# Patient Record
Sex: FEMALE | Race: ASIAN | NOT HISPANIC OR LATINO
[De-identification: names, ages, dates, MRNs, and addresses within clinical notes are randomized per-mention and may not be internally consistent; named-entity substitution may affect disease eponyms.]

---

## 2020-12-17 PROBLEM — Z00.00 ENCOUNTER FOR PREVENTIVE HEALTH EXAMINATION: Status: ACTIVE | Noted: 2020-12-17

## 2021-01-25 RX ORDER — HUMAN PAPILLOMAVIRUS 9-VALENT VACCINE, RECOMBINANT 30; 40; 60; 40; 20; 20; 20; 20; 20 UG/.5ML; UG/.5ML; UG/.5ML; UG/.5ML; UG/.5ML; UG/.5ML; UG/.5ML; UG/.5ML; UG/.5ML
INJECTION, SUSPENSION INTRAMUSCULAR
Qty: 1 | Refills: 1 | Status: ACTIVE | COMMUNITY
Start: 2021-01-25 | End: 1900-01-01

## 2021-02-02 ENCOUNTER — APPOINTMENT (OUTPATIENT)
Dept: OBGYN | Facility: CLINIC | Age: 26
End: 2021-02-02

## 2021-03-13 ENCOUNTER — EMERGENCY (EMERGENCY)
Facility: HOSPITAL | Age: 26
LOS: 1 days | Discharge: HOME | End: 2021-03-13
Attending: STUDENT IN AN ORGANIZED HEALTH CARE EDUCATION/TRAINING PROGRAM
Payer: COMMERCIAL

## 2021-03-13 VITALS
WEIGHT: 130.07 LBS | OXYGEN SATURATION: 96 % | RESPIRATION RATE: 20 BRPM | HEIGHT: 65 IN | DIASTOLIC BLOOD PRESSURE: 59 MMHG | TEMPERATURE: 103 F | HEART RATE: 122 BPM | SYSTOLIC BLOOD PRESSURE: 97 MMHG

## 2021-03-13 VITALS
HEART RATE: 105 BPM | RESPIRATION RATE: 20 BRPM | TEMPERATURE: 99 F | OXYGEN SATURATION: 100 % | SYSTOLIC BLOOD PRESSURE: 96 MMHG | DIASTOLIC BLOOD PRESSURE: 56 MMHG

## 2021-03-13 LAB
ACANTHOCYTES BLD QL SMEAR: SLIGHT — SIGNIFICANT CHANGE UP
ALBUMIN SERPL ELPH-MCNC: 3.5 G/DL — SIGNIFICANT CHANGE UP (ref 3.5–5)
ALP SERPL-CCNC: 56 U/L — SIGNIFICANT CHANGE UP (ref 40–120)
ALT FLD-CCNC: 24 U/L DA — SIGNIFICANT CHANGE UP (ref 10–60)
ANION GAP SERPL CALC-SCNC: 5 MMOL/L — SIGNIFICANT CHANGE UP (ref 5–17)
ANISOCYTOSIS BLD QL: SLIGHT — SIGNIFICANT CHANGE UP
APPEARANCE UR: CLEAR — SIGNIFICANT CHANGE UP
APTT BLD: 34.6 SEC — SIGNIFICANT CHANGE UP (ref 27.5–35.5)
AST SERPL-CCNC: 24 U/L — SIGNIFICANT CHANGE UP (ref 10–40)
BACTERIA # UR AUTO: ABNORMAL /HPF
BASOPHILS # BLD AUTO: 0 K/UL — SIGNIFICANT CHANGE UP (ref 0–0.2)
BASOPHILS NFR BLD AUTO: 0 % — SIGNIFICANT CHANGE UP (ref 0–2)
BILIRUB SERPL-MCNC: 0.5 MG/DL — SIGNIFICANT CHANGE UP (ref 0.2–1.2)
BILIRUB UR-MCNC: NEGATIVE — SIGNIFICANT CHANGE UP
BUN SERPL-MCNC: 17 MG/DL — SIGNIFICANT CHANGE UP (ref 7–18)
CALCIUM SERPL-MCNC: 8.8 MG/DL — SIGNIFICANT CHANGE UP (ref 8.4–10.5)
CHLORIDE SERPL-SCNC: 104 MMOL/L — SIGNIFICANT CHANGE UP (ref 96–108)
CO2 SERPL-SCNC: 28 MMOL/L — SIGNIFICANT CHANGE UP (ref 22–31)
COLOR SPEC: YELLOW — SIGNIFICANT CHANGE UP
CREAT SERPL-MCNC: 0.84 MG/DL — SIGNIFICANT CHANGE UP (ref 0.5–1.3)
DIFF PNL FLD: ABNORMAL
EOSINOPHIL # BLD AUTO: 0 K/UL — SIGNIFICANT CHANGE UP (ref 0–0.5)
EOSINOPHIL NFR BLD AUTO: 0 % — SIGNIFICANT CHANGE UP (ref 0–6)
EPI CELLS # UR: SIGNIFICANT CHANGE UP /HPF
GLUCOSE SERPL-MCNC: 87 MG/DL — SIGNIFICANT CHANGE UP (ref 70–99)
GLUCOSE UR QL: NEGATIVE — SIGNIFICANT CHANGE UP
HCG SERPL-ACNC: <1 MIU/ML — SIGNIFICANT CHANGE UP
HCT VFR BLD CALC: 34.5 % — SIGNIFICANT CHANGE UP (ref 34.5–45)
HGB BLD-MCNC: 11.7 G/DL — SIGNIFICANT CHANGE UP (ref 11.5–15.5)
HIV 1 & 2 AB SERPL IA.RAPID: SIGNIFICANT CHANGE UP
HYPOCHROMIA BLD QL: SLIGHT — SIGNIFICANT CHANGE UP
INR BLD: 1.18 RATIO — HIGH (ref 0.88–1.16)
KETONES UR-MCNC: NEGATIVE — SIGNIFICANT CHANGE UP
LACTATE SERPL-SCNC: 0.8 MMOL/L — SIGNIFICANT CHANGE UP (ref 0.7–2)
LEUKOCYTE ESTERASE UR-ACNC: ABNORMAL
LYMPHOCYTES # BLD AUTO: 0.3 K/UL — LOW (ref 1–3.3)
LYMPHOCYTES # BLD AUTO: 4 % — LOW (ref 13–44)
MANUAL SMEAR VERIFICATION: SIGNIFICANT CHANGE UP
MCHC RBC-ENTMCNC: 31 PG — SIGNIFICANT CHANGE UP (ref 27–34)
MCHC RBC-ENTMCNC: 33.9 GM/DL — SIGNIFICANT CHANGE UP (ref 32–36)
MCV RBC AUTO: 91.5 FL — SIGNIFICANT CHANGE UP (ref 80–100)
MICROCYTES BLD QL: SLIGHT — SIGNIFICANT CHANGE UP
MONOCYTES # BLD AUTO: 0.15 K/UL — SIGNIFICANT CHANGE UP (ref 0–0.9)
MONOCYTES NFR BLD AUTO: 2 % — SIGNIFICANT CHANGE UP (ref 2–14)
NEUTROPHILS # BLD AUTO: 7.08 K/UL — SIGNIFICANT CHANGE UP (ref 1.8–7.4)
NEUTROPHILS NFR BLD AUTO: 89 % — HIGH (ref 43–77)
NEUTS BAND # BLD: 5 % — SIGNIFICANT CHANGE UP (ref 0–8)
NITRITE UR-MCNC: NEGATIVE — SIGNIFICANT CHANGE UP
NRBC # BLD: 0 /100 — SIGNIFICANT CHANGE UP (ref 0–0)
PH UR: 7 — SIGNIFICANT CHANGE UP (ref 5–8)
PLAT MORPH BLD: NORMAL — SIGNIFICANT CHANGE UP
PLATELET # BLD AUTO: 175 K/UL — SIGNIFICANT CHANGE UP (ref 150–400)
POIKILOCYTOSIS BLD QL AUTO: SLIGHT — SIGNIFICANT CHANGE UP
POLYCHROMASIA BLD QL SMEAR: SLIGHT — SIGNIFICANT CHANGE UP
POTASSIUM SERPL-MCNC: 3.5 MMOL/L — SIGNIFICANT CHANGE UP (ref 3.5–5.3)
POTASSIUM SERPL-SCNC: 3.5 MMOL/L — SIGNIFICANT CHANGE UP (ref 3.5–5.3)
PROT SERPL-MCNC: 6.9 G/DL — SIGNIFICANT CHANGE UP (ref 6–8.3)
PROT UR-MCNC: NEGATIVE — SIGNIFICANT CHANGE UP
PROTHROM AB SERPL-ACNC: 13.9 SEC — HIGH (ref 10.6–13.6)
RBC # BLD: 3.77 M/UL — LOW (ref 3.8–5.2)
RBC # FLD: 12.3 % — SIGNIFICANT CHANGE UP (ref 10.3–14.5)
RBC BLD AUTO: ABNORMAL
RBC CASTS # UR COMP ASSIST: ABNORMAL /HPF (ref 0–2)
SARS-COV-2 RNA SPEC QL NAA+PROBE: SIGNIFICANT CHANGE UP
SODIUM SERPL-SCNC: 137 MMOL/L — SIGNIFICANT CHANGE UP (ref 135–145)
SP GR SPEC: 1 — LOW (ref 1.01–1.02)
SPHEROCYTES BLD QL SMEAR: SLIGHT — SIGNIFICANT CHANGE UP
UROBILINOGEN FLD QL: NEGATIVE — SIGNIFICANT CHANGE UP
WBC # BLD: 7.53 K/UL — SIGNIFICANT CHANGE UP (ref 3.8–10.5)
WBC # FLD AUTO: 7.53 K/UL — SIGNIFICANT CHANGE UP (ref 3.8–10.5)
WBC UR QL: ABNORMAL /HPF (ref 0–5)

## 2021-03-13 PROCEDURE — 80053 COMPREHEN METABOLIC PANEL: CPT

## 2021-03-13 PROCEDURE — 84702 CHORIONIC GONADOTROPIN TEST: CPT

## 2021-03-13 PROCEDURE — 83605 ASSAY OF LACTIC ACID: CPT

## 2021-03-13 PROCEDURE — 74177 CT ABD & PELVIS W/CONTRAST: CPT | Mod: 26,MG

## 2021-03-13 PROCEDURE — 96375 TX/PRO/DX INJ NEW DRUG ADDON: CPT

## 2021-03-13 PROCEDURE — U0005: CPT

## 2021-03-13 PROCEDURE — 93005 ELECTROCARDIOGRAM TRACING: CPT

## 2021-03-13 PROCEDURE — 85610 PROTHROMBIN TIME: CPT

## 2021-03-13 PROCEDURE — 87077 CULTURE AEROBIC IDENTIFY: CPT

## 2021-03-13 PROCEDURE — G1004: CPT

## 2021-03-13 PROCEDURE — 71045 X-RAY EXAM CHEST 1 VIEW: CPT

## 2021-03-13 PROCEDURE — 96365 THER/PROPH/DIAG IV INF INIT: CPT | Mod: XU

## 2021-03-13 PROCEDURE — 74177 CT ABD & PELVIS W/CONTRAST: CPT

## 2021-03-13 PROCEDURE — 81001 URINALYSIS AUTO W/SCOPE: CPT

## 2021-03-13 PROCEDURE — 87086 URINE CULTURE/COLONY COUNT: CPT

## 2021-03-13 PROCEDURE — 87635 SARS-COV-2 COVID-19 AMP PRB: CPT

## 2021-03-13 PROCEDURE — 87150 DNA/RNA AMPLIFIED PROBE: CPT

## 2021-03-13 PROCEDURE — 87186 SC STD MICRODIL/AGAR DIL: CPT

## 2021-03-13 PROCEDURE — 71045 X-RAY EXAM CHEST 1 VIEW: CPT | Mod: 26

## 2021-03-13 PROCEDURE — 87040 BLOOD CULTURE FOR BACTERIA: CPT

## 2021-03-13 PROCEDURE — 85025 COMPLETE CBC W/AUTO DIFF WBC: CPT

## 2021-03-13 PROCEDURE — 99285 EMERGENCY DEPT VISIT HI MDM: CPT

## 2021-03-13 PROCEDURE — 96361 HYDRATE IV INFUSION ADD-ON: CPT

## 2021-03-13 PROCEDURE — 96374 THER/PROPH/DIAG INJ IV PUSH: CPT

## 2021-03-13 PROCEDURE — 99284 EMERGENCY DEPT VISIT MOD MDM: CPT | Mod: 25

## 2021-03-13 PROCEDURE — 36415 COLL VENOUS BLD VENIPUNCTURE: CPT

## 2021-03-13 PROCEDURE — 85730 THROMBOPLASTIN TIME PARTIAL: CPT

## 2021-03-13 PROCEDURE — 86703 HIV-1/HIV-2 1 RESULT ANTBDY: CPT

## 2021-03-13 RX ORDER — ONDANSETRON 8 MG/1
4 TABLET, FILM COATED ORAL ONCE
Refills: 0 | Status: COMPLETED | OUTPATIENT
Start: 2021-03-13 | End: 2021-03-13

## 2021-03-13 RX ORDER — CEFDINIR 250 MG/5ML
1 POWDER, FOR SUSPENSION ORAL
Qty: 28 | Refills: 0
Start: 2021-03-13 | End: 2021-03-26

## 2021-03-13 RX ORDER — IOHEXOL 300 MG/ML
30 INJECTION, SOLUTION INTRAVENOUS ONCE
Refills: 0 | Status: COMPLETED | OUTPATIENT
Start: 2021-03-13 | End: 2021-03-13

## 2021-03-13 RX ORDER — PIPERACILLIN AND TAZOBACTAM 4; .5 G/20ML; G/20ML
3.38 INJECTION, POWDER, LYOPHILIZED, FOR SOLUTION INTRAVENOUS ONCE
Refills: 0 | Status: COMPLETED | OUTPATIENT
Start: 2021-03-13 | End: 2021-03-13

## 2021-03-13 RX ORDER — ACETAMINOPHEN 500 MG
975 TABLET ORAL ONCE
Refills: 0 | Status: COMPLETED | OUTPATIENT
Start: 2021-03-13 | End: 2021-03-13

## 2021-03-13 RX ORDER — MORPHINE SULFATE 50 MG/1
4 CAPSULE, EXTENDED RELEASE ORAL ONCE
Refills: 0 | Status: DISCONTINUED | OUTPATIENT
Start: 2021-03-13 | End: 2021-03-13

## 2021-03-13 RX ORDER — SODIUM CHLORIDE 9 MG/ML
1850 INJECTION INTRAMUSCULAR; INTRAVENOUS; SUBCUTANEOUS ONCE
Refills: 0 | Status: COMPLETED | OUTPATIENT
Start: 2021-03-13 | End: 2021-03-13

## 2021-03-13 RX ADMIN — PIPERACILLIN AND TAZOBACTAM 200 GRAM(S): 4; .5 INJECTION, POWDER, LYOPHILIZED, FOR SOLUTION INTRAVENOUS at 15:37

## 2021-03-13 RX ADMIN — SODIUM CHLORIDE 1850 MILLILITER(S): 9 INJECTION INTRAMUSCULAR; INTRAVENOUS; SUBCUTANEOUS at 17:34

## 2021-03-13 RX ADMIN — IOHEXOL 30 MILLILITER(S): 300 INJECTION, SOLUTION INTRAVENOUS at 17:15

## 2021-03-13 RX ADMIN — Medication 975 MILLIGRAM(S): at 16:15

## 2021-03-13 RX ADMIN — Medication 975 MILLIGRAM(S): at 15:35

## 2021-03-13 RX ADMIN — ONDANSETRON 4 MILLIGRAM(S): 8 TABLET, FILM COATED ORAL at 17:15

## 2021-03-13 RX ADMIN — SODIUM CHLORIDE 1850 MILLILITER(S): 9 INJECTION INTRAMUSCULAR; INTRAVENOUS; SUBCUTANEOUS at 15:35

## 2021-03-13 RX ADMIN — MORPHINE SULFATE 4 MILLIGRAM(S): 50 CAPSULE, EXTENDED RELEASE ORAL at 21:43

## 2021-03-13 RX ADMIN — PIPERACILLIN AND TAZOBACTAM 3.38 GRAM(S): 4; .5 INJECTION, POWDER, LYOPHILIZED, FOR SOLUTION INTRAVENOUS at 16:15

## 2021-03-13 NOTE — ED ADULT NURSE NOTE - OBJECTIVE STATEMENT
As per pt, c/o RLQ radiating to the lower back w/ f/c, h/a, SOB and n/v x2days. Pt denies hematuria, CP or diarrhea. LMP 2/28/2021.

## 2021-03-13 NOTE — ED PROVIDER NOTE - CLINICAL SUMMARY MEDICAL DECISION MAKING FREE TEXT BOX
26F presneitng with rlq pain, nausea and vomiting. code sepsis called. possible abdominal etiology. will get labs, CT, will reassess. 26F presenting with rlq pain, nausea and vomiting. code sepsis called. possible abdominal etiology. will get labs, CT, will reassess.

## 2021-03-13 NOTE — ED PROVIDER NOTE - PATIENT PORTAL LINK FT
You can access the FollowMyHealth Patient Portal offered by [facility] by registering at the following website: [email or web address]. By joining [employer]’s FollowMyHealth portal, you will also be able to view your health information using other applications (apps) compatible with our system.

## 2021-03-13 NOTE — ED PROVIDER NOTE - OBJECTIVE STATEMENT
26F, pmh of Tourette's, anxiety, depression, presenting with two days of abdominal pain. pain is worse in right lower quadrant. has nausea and vomiting. no chest pain, shortness of breath, pain or burning with urination. no known sick contacts.

## 2021-03-13 NOTE — ED ADULT NURSE NOTE - CHIEF COMPLAINT QUOTE
rlq pain for the last 2 today with vomiting toady, sent from [facility] [place] to r/o epi

## 2021-03-13 NOTE — ED ADULT TRIAGE NOTE - CHIEF COMPLAINT QUOTE
rlq pain for the last 2 today with vomiting toady, sent from [facility] to r/o epi

## 2021-03-13 NOTE — ED PROVIDER NOTE - NSFOLLOWUPINSTRUCTIONS_ED_ALL_ED_FT
Pyelonephritis, Adult    Pyelonephritis is an infection that occurs in the kidney. The kidneys are the organs that filter a person's blood and move waste out of the bloodstream and into the urine. Urine passes from the kidneys, through tubes called ureters, and into the bladder. There are two main types of pyelonephritis:  •Infections that come on quickly without any warning (acute pyelonephritis).      •Infections that last for a long period of time (chronic pyelonephritis).      In most cases, the infection clears up with treatment and does not cause further problems. More severe infections or chronic infections can sometimes spread to the bloodstream or lead to other problems with the kidneys.      What are the causes?  This condition is usually caused by:  •Bacteria traveling from the bladder up to the kidney. This may occur after having a bladder infection (cystitis) or urinary tract infection (UTI).      •Bladder infections caused from bacteria traveling from the bloodstream to the kidney.        What increases the risk?  This condition is more likely to develop in:  •Pregnant women.      •Older people.    •People who have any of these conditions:  •Diabetes.      •Inflammation of the prostate gland (prostatitis), in males.      •Kidney stones or bladder stones.      •Other abnormalities of the kidney or ureter.      •Cancer.        •People who have a catheter placed in the bladder.      •People who are sexually active.      •Women who use spermicides.      •People who have had a prior UTI.        What are the signs or symptoms?  Symptoms of this condition include:  •Frequent urination.      •Strong or persistent urge to urinate.      •Burning or stinging when urinating.      •Abdominal pain.      •Back pain.      •Pain in the side or flank area.      •Fever or chills.      •Blood in the urine, or dark urine.      •Nausea or vomiting.        How is this diagnosed?  This condition may be diagnosed based on:  •Your medical history and a physical exam.      •Urine tests.      •Blood tests.      You may also have imaging tests of the kidneys, such as an ultrasound or CT scan.      How is this treated?  Treatment for this condition may depend on the severity of the infection.  •If the infection is mild and is found early, you may be treated with antibiotic medicines taken by mouth (orally). You will need to drink fluids to remain hydrated.      •If the infection is more severe, you may need to stay in the hospital and receive antibiotics given directly into a vein through an IV. You may also need to receive fluids through an IV if you are not able to remain hydrated. After your hospital stay, you may need to take oral antibiotics for a period of time.      Other treatments may be required, depending on the cause of the infection.      Follow these instructions at home:    Medicines     •Take your antibiotic medicine as told by your health care provider. Do not stop taking the antibiotic even if you start to feel better.      •Take over-the-counter and prescription medicines only as told by your health care provider.        General instructions      •Drink enough fluid to keep your urine pale yellow.      •Avoid caffeine, tea, and carbonated beverages. They tend to irritate the bladder.      •Urinate often. Avoid holding in urine for long periods of time.      •Urinate before and after sex.      •After a bowel movement, women should cleanse from front to back. Use each tissue only once.      •Keep all follow-up visits as told by your health care provider. This is important.        Contact a health care provider if:    •Your symptoms do not get better after 2 days of treatment.      •Your symptoms get worse.      •You have a fever.        Get help right away if you:    •Are unable to take your antibiotics or fluids.      •Have shaking chills.      •Vomit.      •Have severe flank or back pain.      •Have extreme weakness or fainting.        Summary    •Pyelonephritis is a urinary tract infection (UTI) that occurs in the kidney.      •Treatment for this condition may depend on the severity of the infection.      •Take your antibiotic medicine as told by your health care provider. Do not stop taking the antibiotic even if you start to feel better.      •Drink enough fluid to keep your urine pale yellow.      •Keep all follow-up visits as told by your health care provider. This is important.      This information is not intended to replace advice given to you by your health care provider. Make sure you discuss any questions you have with your health care provider.      Document Revised: 10/22/2019 Document Reviewed: 10/22/2019    Elsevier Patient Education © 2021 [employer]

## 2021-03-13 NOTE — ED PROVIDER NOTE - PHYSICAL EXAMINATION
General: well appearing female, no acute distress   HEENT: normocephalic, atraumatic   Respiratory: normal work of breathing, lungs clear to auscultation bilaterally   Cardiac: regular rate and rhythm   Abdomen: soft, RLQ tenderness to palpation, no guarding or rebound   MSK: no swelling or tenderness of lower extremities, moving all extremities spontaneously   Skin: warm, dry   Neuro: A&Ox3  Psych: appropriate affect

## 2021-03-14 ENCOUNTER — INPATIENT (INPATIENT)
Facility: HOSPITAL | Age: 26
LOS: 1 days | Discharge: HOME | End: 2021-03-16
Attending: STUDENT IN AN ORGANIZED HEALTH CARE EDUCATION/TRAINING PROGRAM | Admitting: STUDENT IN AN ORGANIZED HEALTH CARE EDUCATION/TRAINING PROGRAM
Payer: COMMERCIAL

## 2021-03-14 VITALS
RESPIRATION RATE: 20 BRPM | HEART RATE: 95 BPM | OXYGEN SATURATION: 100 % | SYSTOLIC BLOOD PRESSURE: 103 MMHG | TEMPERATURE: 98 F | DIASTOLIC BLOOD PRESSURE: 60 MMHG | HEIGHT: 65 IN

## 2021-03-14 DIAGNOSIS — Z98.890 OTHER SPECIFIED POSTPROCEDURAL STATES: Chronic | ICD-10-CM

## 2021-03-14 DIAGNOSIS — F32.9 MAJOR DEPRESSIVE DISORDER, SINGLE EPISODE, UNSPECIFIED: ICD-10-CM

## 2021-03-14 DIAGNOSIS — D72.829 ELEVATED WHITE BLOOD CELL COUNT, UNSPECIFIED: ICD-10-CM

## 2021-03-14 DIAGNOSIS — A41.9 SEPSIS, UNSPECIFIED ORGANISM: ICD-10-CM

## 2021-03-14 DIAGNOSIS — Z29.9 ENCOUNTER FOR PROPHYLACTIC MEASURES, UNSPECIFIED: ICD-10-CM

## 2021-03-14 DIAGNOSIS — F95.2 TOURETTE'S DISORDER: ICD-10-CM

## 2021-03-14 DIAGNOSIS — N12 TUBULO-INTERSTITIAL NEPHRITIS, NOT SPECIFIED AS ACUTE OR CHRONIC: ICD-10-CM

## 2021-03-14 DIAGNOSIS — R79.89 OTHER SPECIFIED ABNORMAL FINDINGS OF BLOOD CHEMISTRY: ICD-10-CM

## 2021-03-14 DIAGNOSIS — F41.9 ANXIETY DISORDER, UNSPECIFIED: ICD-10-CM

## 2021-03-14 DIAGNOSIS — R78.81 BACTEREMIA: ICD-10-CM

## 2021-03-14 LAB
ALBUMIN SERPL ELPH-MCNC: 3.8 G/DL — SIGNIFICANT CHANGE UP (ref 3.3–5)
ALP SERPL-CCNC: 60 U/L — SIGNIFICANT CHANGE UP (ref 40–120)
ALT FLD-CCNC: 21 U/L — SIGNIFICANT CHANGE UP (ref 4–33)
ANION GAP SERPL CALC-SCNC: 10 MMOL/L — SIGNIFICANT CHANGE UP (ref 7–14)
APPEARANCE UR: CLEAR — SIGNIFICANT CHANGE UP
AST SERPL-CCNC: 22 U/L — SIGNIFICANT CHANGE UP (ref 4–32)
BACTERIA # UR AUTO: ABNORMAL
BILIRUB SERPL-MCNC: 0.5 MG/DL — SIGNIFICANT CHANGE UP (ref 0.2–1.2)
BILIRUB UR-MCNC: NEGATIVE — SIGNIFICANT CHANGE UP
BLOOD GAS VENOUS COMPREHENSIVE RESULT: SIGNIFICANT CHANGE UP
BLOOD GAS VENOUS COMPREHENSIVE RESULT: SIGNIFICANT CHANGE UP
BUN SERPL-MCNC: 10 MG/DL — SIGNIFICANT CHANGE UP (ref 7–23)
CALCIUM SERPL-MCNC: 9.1 MG/DL — SIGNIFICANT CHANGE UP (ref 8.4–10.5)
CHLORIDE SERPL-SCNC: 103 MMOL/L — SIGNIFICANT CHANGE UP (ref 98–107)
CO2 SERPL-SCNC: 25 MMOL/L — SIGNIFICANT CHANGE UP (ref 22–31)
COLOR SPEC: YELLOW — SIGNIFICANT CHANGE UP
CREAT SERPL-MCNC: 0.75 MG/DL — SIGNIFICANT CHANGE UP (ref 0.5–1.3)
CULTURE RESULTS: SIGNIFICANT CHANGE UP
DIFF PNL FLD: ABNORMAL
E COLI DNA BLD POS QL NAA+NON-PROBE: SIGNIFICANT CHANGE UP
EPI CELLS # UR: 3 /HPF — SIGNIFICANT CHANGE UP (ref 0–5)
GLUCOSE SERPL-MCNC: 79 MG/DL — SIGNIFICANT CHANGE UP (ref 70–99)
GLUCOSE UR QL: NEGATIVE — SIGNIFICANT CHANGE UP
GRAM STN FLD: SIGNIFICANT CHANGE UP
HCT VFR BLD CALC: 36.3 % — SIGNIFICANT CHANGE UP (ref 34.5–45)
HGB BLD-MCNC: 11.5 G/DL — SIGNIFICANT CHANGE UP (ref 11.5–15.5)
IANC: 11.69 K/UL — HIGH (ref 1.5–8.5)
KETONES UR-MCNC: ABNORMAL
LEUKOCYTE ESTERASE UR-ACNC: ABNORMAL
MCHC RBC-ENTMCNC: 30.2 PG — SIGNIFICANT CHANGE UP (ref 27–34)
MCHC RBC-ENTMCNC: 31.7 GM/DL — LOW (ref 32–36)
MCV RBC AUTO: 95.3 FL — SIGNIFICANT CHANGE UP (ref 80–100)
METHOD TYPE: SIGNIFICANT CHANGE UP
NITRITE UR-MCNC: NEGATIVE — SIGNIFICANT CHANGE UP
PH UR: 6.5 — SIGNIFICANT CHANGE UP (ref 5–8)
PLATELET # BLD AUTO: 166 K/UL — SIGNIFICANT CHANGE UP (ref 150–400)
POTASSIUM SERPL-MCNC: 4.2 MMOL/L — SIGNIFICANT CHANGE UP (ref 3.5–5.3)
POTASSIUM SERPL-SCNC: 4.2 MMOL/L — SIGNIFICANT CHANGE UP (ref 3.5–5.3)
PROT SERPL-MCNC: 6.8 G/DL — SIGNIFICANT CHANGE UP (ref 6–8.3)
PROT UR-MCNC: ABNORMAL
RBC # BLD: 3.81 M/UL — SIGNIFICANT CHANGE UP (ref 3.8–5.2)
RBC # FLD: 12.6 % — SIGNIFICANT CHANGE UP (ref 10.3–14.5)
RBC CASTS # UR COMP ASSIST: 6 /HPF — HIGH (ref 0–4)
SODIUM SERPL-SCNC: 138 MMOL/L — SIGNIFICANT CHANGE UP (ref 135–145)
SP GR SPEC: 1.02 — SIGNIFICANT CHANGE UP (ref 1.01–1.02)
SPECIMEN SOURCE: SIGNIFICANT CHANGE UP
SPECIMEN SOURCE: SIGNIFICANT CHANGE UP
UROBILINOGEN FLD QL: ABNORMAL
WBC # BLD: 13.7 K/UL — HIGH (ref 3.8–10.5)
WBC # FLD AUTO: 13.7 K/UL — HIGH (ref 3.8–10.5)
WBC UR QL: 16 /HPF — HIGH (ref 0–5)

## 2021-03-14 PROCEDURE — 99223 1ST HOSP IP/OBS HIGH 75: CPT

## 2021-03-14 PROCEDURE — 99285 EMERGENCY DEPT VISIT HI MDM: CPT

## 2021-03-14 RX ORDER — ACETAMINOPHEN 500 MG
975 TABLET ORAL ONCE
Refills: 0 | Status: COMPLETED | OUTPATIENT
Start: 2021-03-14 | End: 2021-03-14

## 2021-03-14 RX ORDER — CEFTRIAXONE 500 MG/1
2000 INJECTION, POWDER, FOR SOLUTION INTRAMUSCULAR; INTRAVENOUS ONCE
Refills: 0 | Status: COMPLETED | OUTPATIENT
Start: 2021-03-14 | End: 2021-03-14

## 2021-03-14 RX ORDER — SERTRALINE 25 MG/1
1 TABLET, FILM COATED ORAL
Qty: 0 | Refills: 0 | DISCHARGE

## 2021-03-14 RX ORDER — PIPERACILLIN AND TAZOBACTAM 4; .5 G/20ML; G/20ML
3.38 INJECTION, POWDER, LYOPHILIZED, FOR SOLUTION INTRAVENOUS EVERY 8 HOURS
Refills: 0 | Status: DISCONTINUED | OUTPATIENT
Start: 2021-03-14 | End: 2021-03-16

## 2021-03-14 RX ORDER — ACETAMINOPHEN 500 MG
650 TABLET ORAL EVERY 6 HOURS
Refills: 0 | Status: DISCONTINUED | OUTPATIENT
Start: 2021-03-14 | End: 2021-03-15

## 2021-03-14 RX ORDER — SODIUM CHLORIDE 9 MG/ML
3 INJECTION INTRAMUSCULAR; INTRAVENOUS; SUBCUTANEOUS EVERY 8 HOURS
Refills: 0 | Status: DISCONTINUED | OUTPATIENT
Start: 2021-03-14 | End: 2021-03-16

## 2021-03-14 RX ORDER — FLUPHENAZINE HYDROCHLORIDE 1 MG/1
6 TABLET, FILM COATED ORAL
Qty: 0 | Refills: 0 | DISCHARGE

## 2021-03-14 RX ORDER — PIPERACILLIN AND TAZOBACTAM 4; .5 G/20ML; G/20ML
3.38 INJECTION, POWDER, LYOPHILIZED, FOR SOLUTION INTRAVENOUS ONCE
Refills: 0 | Status: COMPLETED | OUTPATIENT
Start: 2021-03-14 | End: 2021-03-14

## 2021-03-14 RX ORDER — CLONAZEPAM 1 MG
1 TABLET ORAL THREE TIMES A DAY
Refills: 0 | Status: DISCONTINUED | OUTPATIENT
Start: 2021-03-14 | End: 2021-03-16

## 2021-03-14 RX ORDER — SODIUM CHLORIDE 9 MG/ML
2000 INJECTION INTRAMUSCULAR; INTRAVENOUS; SUBCUTANEOUS ONCE
Refills: 0 | Status: COMPLETED | OUTPATIENT
Start: 2021-03-14 | End: 2021-03-14

## 2021-03-14 RX ORDER — BUPROPION HYDROCHLORIDE 150 MG/1
1 TABLET, EXTENDED RELEASE ORAL
Qty: 0 | Refills: 0 | DISCHARGE

## 2021-03-14 RX ORDER — KETOROLAC TROMETHAMINE 30 MG/ML
30 SYRINGE (ML) INJECTION ONCE
Refills: 0 | Status: DISCONTINUED | OUTPATIENT
Start: 2021-03-14 | End: 2021-03-14

## 2021-03-14 RX ORDER — HEPARIN SODIUM 5000 [USP'U]/ML
5000 INJECTION INTRAVENOUS; SUBCUTANEOUS EVERY 12 HOURS
Refills: 0 | Status: DISCONTINUED | OUTPATIENT
Start: 2021-03-14 | End: 2021-03-15

## 2021-03-14 RX ORDER — FLUPHENAZINE HYDROCHLORIDE 1 MG/1
2 TABLET, FILM COATED ORAL THREE TIMES A DAY
Refills: 0 | Status: DISCONTINUED | OUTPATIENT
Start: 2021-03-14 | End: 2021-03-16

## 2021-03-14 RX ORDER — BUPROPION HYDROCHLORIDE 150 MG/1
300 TABLET, EXTENDED RELEASE ORAL DAILY
Refills: 0 | Status: DISCONTINUED | OUTPATIENT
Start: 2021-03-14 | End: 2021-03-16

## 2021-03-14 RX ORDER — DIPHENHYDRAMINE HCL 50 MG
25 CAPSULE ORAL ONCE
Refills: 0 | Status: COMPLETED | OUTPATIENT
Start: 2021-03-14 | End: 2021-03-14

## 2021-03-14 RX ORDER — ARIPIPRAZOLE 15 MG/1
1 TABLET ORAL
Qty: 0 | Refills: 0 | DISCHARGE

## 2021-03-14 RX ORDER — SERTRALINE 25 MG/1
100 TABLET, FILM COATED ORAL DAILY
Refills: 0 | Status: DISCONTINUED | OUTPATIENT
Start: 2021-03-14 | End: 2021-03-16

## 2021-03-14 RX ORDER — ARIPIPRAZOLE 15 MG/1
10 TABLET ORAL DAILY
Refills: 0 | Status: DISCONTINUED | OUTPATIENT
Start: 2021-03-14 | End: 2021-03-16

## 2021-03-14 RX ORDER — CLONAZEPAM 1 MG
1 TABLET ORAL
Qty: 0 | Refills: 0 | DISCHARGE

## 2021-03-14 RX ADMIN — SERTRALINE 100 MILLIGRAM(S): 25 TABLET, FILM COATED ORAL at 23:02

## 2021-03-14 RX ADMIN — CEFTRIAXONE 100 MILLIGRAM(S): 500 INJECTION, POWDER, FOR SOLUTION INTRAMUSCULAR; INTRAVENOUS at 18:51

## 2021-03-14 RX ADMIN — SODIUM CHLORIDE 3 MILLILITER(S): 9 INJECTION INTRAMUSCULAR; INTRAVENOUS; SUBCUTANEOUS at 21:04

## 2021-03-14 RX ADMIN — SODIUM CHLORIDE 2000 MILLILITER(S): 9 INJECTION INTRAMUSCULAR; INTRAVENOUS; SUBCUTANEOUS at 20:13

## 2021-03-14 RX ADMIN — FLUPHENAZINE HYDROCHLORIDE 2 MILLIGRAM(S): 1 TABLET, FILM COATED ORAL at 22:53

## 2021-03-14 RX ADMIN — PIPERACILLIN AND TAZOBACTAM 200 GRAM(S): 4; .5 INJECTION, POWDER, LYOPHILIZED, FOR SOLUTION INTRAVENOUS at 22:36

## 2021-03-14 RX ADMIN — Medication 650 MILLIGRAM(S): at 22:19

## 2021-03-14 RX ADMIN — Medication 975 MILLIGRAM(S): at 20:13

## 2021-03-14 RX ADMIN — Medication 1 MILLIGRAM(S): at 22:54

## 2021-03-14 RX ADMIN — Medication 30 MILLIGRAM(S): at 20:13

## 2021-03-14 RX ADMIN — Medication 650 MILLIGRAM(S): at 21:19

## 2021-03-14 RX ADMIN — Medication 25 MILLIGRAM(S): at 18:51

## 2021-03-14 RX ADMIN — Medication 40 MILLIGRAM(S): at 18:51

## 2021-03-14 NOTE — H&P ADULT - PROBLEM SELECTOR PLAN 4
WBC count of 13.70 likely in setting of pyelonephritis.  Continue to trend. Patient with lactate of 2.4.  Patient s/p 2 liters Normal saline bolus.  Repeat VBG in AM.

## 2021-03-14 NOTE — H&P ADULT - PROBLEM SELECTOR PLAN 1
Admit to medicine, continue monitoring.  CTAP showing Multifocal pyelonephritis involving the upper and lower pole right kidney, with uroepithelial hyperenhancement in the proximal right ureter.  Blood cultures positive for gram negative rods/ Found to be growing E. Coli.  Patient s/p Ceftriaxone 2000 mg. Continue ceftriaxone 2000mg q24h.  Monitor vitals closely.  Acetaminophen 650 prn q6h for Fever. Admit to medicine, continue monitoring.  CTAP showing Multifocal pyelonephritis involving the upper and lower pole right kidney, with uroepithelial hyperenhancement in the proximal right ureter.  Blood cultures positive for gram negative rods/ Found to be growing E. Coli.  Patient s/p Ceftriaxone 2000 mg. Continue ceftriaxone 2000mg q24h.  Consider adding zosyn if patient decompensates.   Monitor vitals closely.  Acetaminophen 650 prn q6h for Fever. Admit to medicine, continue monitoring.  CTAP showing Multifocal pyelonephritis involving the upper and lower pole right kidney, with uroepithelial hyperenhancement in the proximal right ureter.  Blood cultures positive for gram negative rods/ Found to be growing E. Coli.  Patient s/p Ceftriaxone 2000 mg.   Patient started on zosyn.  Monitor vitals closely.  Acetaminophen 650 prn q6h for Fever.  Follow up blood and urine cultures. Patient with blood cultures growing gram negative rods (E.coli)  Patient meets sepsis criteria due to fever and leukocytosis.  Monitor vitals q4h.  Continue Zosyn.

## 2021-03-14 NOTE — H&P ADULT - NSHPSOCIALHISTORY_GEN_ALL_CORE
Patient lives with mother and father. Patient denies use of cigarettes, drugs and alcohol. Patient works as .

## 2021-03-14 NOTE — ED PROVIDER NOTE - OBJECTIVE STATEMENT
25 Y/O F PMH Tourettes Anxiety Depression C/O 3 days of lower abdominal pain radiating to the back. Pt states she has been having persistent chills, weakness and has been feeling unwell since her ED visit on 3/13. Pt denies nausea or diarrhea, pt denies any other sx or acute complaints. Pt was called back regarding + blood cultures (Aidan neg Kenji's).

## 2021-03-14 NOTE — H&P ADULT - NEGATIVE GASTROINTESTINAL SYMPTOMS
Patient called to verify if Actos needs to be stopped    Reviewed message with Darlene GOMES RN; Actos needs to be stopped.    Patient took dose today but will stop future doses.   no diarrhea

## 2021-03-14 NOTE — ED ADULT NURSE REASSESSMENT NOTE - NS ED NURSE REASSESS COMMENT FT1
Admitting PA at bedside and aware of temperature at this time. Patient offering no complaints and resting comfortably.

## 2021-03-14 NOTE — H&P ADULT - PROBLEM SELECTOR PLAN 2
Patient with blood cultures growing gram negative rods.  Patient meets sepsis criteria due to fever and leukocytosis.  Monitor vitals q4h.  Continue ceftriaxone 2 gram q24 hours. Patient with blood cultures growing gram negative rods (E.coli)  Patient meets sepsis criteria due to fever and leukocytosis.  Monitor vitals q4h.  Continue Zosyn. Admit to medicine, continue monitoring.  CTAP showing Multifocal pyelonephritis involving the upper and lower pole right kidney, with uroepithelial hyperenhancement in the proximal right ureter.  Blood cultures positive for gram negative rods/ Found to be growing E. Coli.  Patient s/p Ceftriaxone 2000 mg.   Patient started on zosyn.  Monitor vitals closely.  Acetaminophen 650 prn q6h for Fever.  Follow up blood and urine cultures.

## 2021-03-14 NOTE — ED ADULT NURSE NOTE - OBJECTIVE STATEMENT
Received Pt in intake room 9. pt A&OX3, ambulatory. pt with hx of depression, anxiety. Pt reports was seen in the ED yesterday for lower abdominal pain, was called to come into the ED today for positive urine cultures. pt reports lower abdominal pain at this time and headache but denies any other complaints. denies cp, sob, cough, fever/chills, n/v/d. respirations are equal and nonlabored, no respiratory distress noted. 20 gauge iv placed in the right ac, labs sent. pt stable. will medicate as per orders.

## 2021-03-14 NOTE — H&P ADULT - PROBLEM SELECTOR PLAN 7
Continue Abilify 10 mg daily and fluphenazine 1 mg,6 tabs daily. Continue sertraline 100 mg daily and clonazepam1 mg TID.

## 2021-03-14 NOTE — H&P ADULT - PROBLEM SELECTOR PLAN 6
Continue sertraline 100 mg daily and clonazepam1 mg TID. Continue Bupropion 300 mg daily, and abilify.

## 2021-03-14 NOTE — ED PROVIDER NOTE - CLINICAL SUMMARY MEDICAL DECISION MAKING FREE TEXT BOX
25 Y/O F PMH Tourettes Anxiety Depression C/O 3 days of lower abdominal pain radiating to the back. Pt states she has been having persistent chills, weakness and has been feeling unwell since her ED visit on 3/13. Plan is antibiotics for + blood culture, labs to eval for leukocytosis or elevated lactate, if + suspicious for sepsis. Pt is stable, not requiring pressors at this time.

## 2021-03-14 NOTE — H&P ADULT - PROBLEM SELECTOR PLAN 8
Heparin subcutaneous 5000 units q12h. Continue Abilify 10 mg daily and fluphenazine 1 mg,6 tabs daily.

## 2021-03-14 NOTE — H&P ADULT - NSHPLABSRESULTS_GEN_ALL_CORE
Vital Signs Last 24 Hrs  T(C): 38.1 (14 Mar 2021 21:00), Max: 38.1 (14 Mar 2021 21:00)  T(F): 100.5 (14 Mar 2021 21:00), Max: 100.5 (14 Mar 2021 21:00)  HR: 97 (14 Mar 2021 21:00) (95 - 97)  BP: 105/58 (14 Mar 2021 21:00) (103/60 - 109/63)  BP(mean): --  RR: 18 (14 Mar 2021 21:00) (18 - 20)  SpO2: 98% (14 Mar 2021 21:00) (98% - 100%)                          11.5   13.70 )-----------( 166      ( 14 Mar 2021 19:25 )             36.3   03-14    138  |  103  |  10  ----------------------------<  79  4.2   |  25  |  0.75    Ca    9.1      14 Mar 2021 19:31    TPro  6.8  /  Alb  3.8  /  TBili  0.5  /  DBili  x   /  AST  22  /  ALT  21  /  AlkPhos  60  03-14    PT/INR - ( 13 Mar 2021 15:51 )   PT: 13.9 sec;   INR: 1.18 ratio         PTT - ( 13 Mar 2021 15:51 )  PTT:34.6 sec    19:25 - VBG - pH: 7.31  | pCO2: 56    | pO2: <24   | Lactate: 2.4      Urinalysis Basic - ( 14 Mar 2021 19:25 )    Color: Yellow / Appearance: Clear / S.021 / pH: x  Gluc: x / Ketone: Small  / Bili: Negative / Urobili: 12 mg/dL   Blood: x / Protein: 30 mg/dL / Nitrite: Negative   Leuk Esterase: Small / RBC: 6 /HPF / WBC 16 /HPF   Sq Epi: x / Non Sq Epi: 3 /HPF / Bacteria: Few      Urine and blood culture.  .Urine Clean Catch (Midstream)   @ 22:00   <10,000 CFU/mL Normal Urogenital Ramona  --  --      .Blood Blood-Peripheral   @ 21:57   Growth in anaerobic bottle: Gram Negative Rods  ***Blood Panel PCR results on this specimen are available  approximately 3 hours after the Gram stain result.***  Gram stain, PCR, and/or culture results may not always  correspond due to difference in methodologies.  ************************************************************  This PCR assay was performed by multiplex PCR. This  Assay tests for 66 bacterial and resistance gene targets.  Please refer to the [facility] test directory  at [email or web address] for details.  --  Blood Culture PCR       : CT Abdomen and pelvis:  FINDINGS:  LOWER CHEST: Clear    LIVER: Focal hepatic steatosis adjacent to the gallbladder fossa.  BILE DUCTS: Normal caliber.  GALLBLADDER: Nonspecific edema along the hepatic margin.  SPLEEN: Within normal limits.  PANCREAS: Within normal limits.  ADRENALS: Within normal limits.  KIDNEYS/URETERS: Multifocal pyelonephritis involving the upper and lower pole right kidney, with uroepithelial hyperenhancement in the proximal right ureter. No abscess.    BLADDER: No abnormal mural thickening.  REPRODUCTIVE ORGANS: 2 cm right ovarian cyst. Possible bilateral hydrosalpinx..    BOWEL: No bowel obstruction. Enteric contrast transits to the rectum.Normal appendix.  PERITONEUM: No intraperitoneal free air.  VESSELS: Normal in caliber.  RETROPERITONEUM/LYMPH NODES: No hemorrhage. No enlarged lymph node measuring greater than 10 mm in short axis.  ABDOMINAL WALL: Intact  BONES: No acute osseous abnormality.    IMPRESSION:    1. Multifocal pyelonephritis in the right kidney without abscess.  2. Possible mild bilateral hydrosalpinx.    CXR: Findings/  Impression: The heart is unremarkable. The lungs are clear. Bones are unremarkable for age.     EKG: Sinus rhythm at 95 bpm. QT/QTc 318/399. No acute T wave or ST changes. 11.5   13.70 )-----------( 166      ( 14 Mar 2021 19:25 )             36.3   03-14    138  |  103  |  10  ----------------------------<  79  4.2   |  25  |  0.75    Ca    9.1      14 Mar 2021 19:31    TPro  6.8  /  Alb  3.8  /  TBili  0.5  /  DBili  x   /  AST  22  /  ALT  21  /  AlkPhos  60  03-14    PT/INR - ( 13 Mar 2021 15:51 )   PT: 13.9 sec;   INR: 1.18 ratio         PTT - ( 13 Mar 2021 15:51 )  PTT:34.6 sec    19:25 - VBG - pH: 7.31  | pCO2: 56    | pO2: <24   | Lactate: 2.4      Urinalysis Basic - ( 14 Mar 2021 19:25 )    Color: Yellow / Appearance: Clear / S.021 / pH: x  Gluc: x / Ketone: Small  / Bili: Negative / Urobili: 12 mg/dL   Blood: x / Protein: 30 mg/dL / Nitrite: Negative   Leuk Esterase: Small / RBC: 6 /HPF / WBC 16 /HPF   Sq Epi: x / Non Sq Epi: 3 /HPF / Bacteria: Few      Urine and blood culture.  .Urine Clean Catch (Midstream)   @ 22:00   <10,000 CFU/mL Normal Urogenital Ramona  --  --      .Blood Blood-Peripheral   @ 21:57   Growth in anaerobic bottle: Gram Negative Rods  ***Blood Panel PCR results on this specimen are available  approximately 3 hours after the Gram stain result.***  Gram stain, PCR, and/or culture results may not always  correspond due to difference in methodologies.  ************************************************************  This PCR assay was performed by multiplex PCR. This  Assay tests for 66 bacterial and resistance gene targets.  Please refer to the [facility] [employer] test directory  at [email or web address] for details.  --  Blood Culture PCR       : CT Abdomen and pelvis:  FINDINGS:  LOWER CHEST: Clear    LIVER: Focal hepatic steatosis adjacent to the gallbladder fossa.  BILE DUCTS: Normal caliber.  GALLBLADDER: Nonspecific edema along the hepatic margin.  SPLEEN: Within normal limits.  PANCREAS: Within normal limits.  ADRENALS: Within normal limits.  KIDNEYS/URETERS: Multifocal pyelonephritis involving the upper and lower pole right kidney, with uroepithelial hyperenhancement in the proximal right ureter. No abscess.    BLADDER: No abnormal mural thickening.  REPRODUCTIVE ORGANS: 2 cm right ovarian cyst. Possible bilateral hydrosalpinx..    BOWEL: No bowel obstruction. Enteric contrast transits to the rectum.Normal appendix.  PERITONEUM: No intraperitoneal free air.  VESSELS: Normal in caliber.  RETROPERITONEUM/LYMPH NODES: No hemorrhage. No enlarged lymph node measuring greater than 10 mm in short axis.  ABDOMINAL WALL: Intact  BONES: No acute osseous abnormality.    IMPRESSION:    1. Multifocal pyelonephritis in the right kidney without abscess.  2. Possible mild bilateral hydrosalpinx.    CXR: Findings/  Impression: The heart is unremarkable. The lungs are clear. Bones are unremarkable for age.     EKG: Sinus rhythm at 95 bpm. QT/QTc 318/399. No acute T wave or ST changes.

## 2021-03-14 NOTE — ED PROVIDER NOTE - CARE PLAN
Principal Discharge DX:	Sepsis  Secondary Diagnosis:	Pyelonephritis  Secondary Diagnosis:	Blood culture positive for microorganism   Principal Discharge DX:	Sepsis  Secondary Diagnosis:	Pyelonephritis  Secondary Diagnosis:	Blood culture positive for microorganism  Secondary Diagnosis:	Hives

## 2021-03-14 NOTE — H&P ADULT - PROBLEM SELECTOR PLAN 5
Continue Bupropion 300 mg daily, and abilify. WBC count of 13.70 likely in setting of pyelonephritis.  Continue to trend.

## 2021-03-14 NOTE — H&P ADULT - HISTORY OF PRESENT ILLNESS
27 y/o F with PMH of Tourette syndrome. anxiety and depression presents to the ED with complaining of lower abdominal pain. Patient  27 y/o F with PMH of Tourette syndrome. anxiety and depression presents to the ED with complaining of lower abdominal pain. Patient states that abdominal pain started in the right lower quadrant 3 days ago and has since radiated to to her right lower back. Patient reports that she was seen in the ED yesterday for the same complaint and was discharged on antibiotics which she did not start taking as yet. Patient reports that pain worsened today and endorsed having fever, chills, generalized weakness, nausea and vomiting at home. Blood cultures drawn yesterday in ED resulted positive for gram negative rods. CTAP done also showed Multifocal pyelonephritis involving the upper and lower pole right kidney, with uroepithelial hyperenhancement in the proximal right ureter.    In ED patient latest vitals are as follows T 100.5, HR 97, /58, RR 18 with oxygen saturation of 98% on RA. Patient received 2 grams of IVPB Ceftriaxone and received 2 liters NS bolus.

## 2021-03-14 NOTE — H&P ADULT - ASSESSMENT
25 y/o F with PMH of Tourette syndrome. anxiety and depression presents to the ED with complaining of lower abdominal pain, found to have pyelonephritis.

## 2021-03-14 NOTE — ED PROVIDER NOTE - SKIN, MLM
Skin normal color for race, warm, dry and intact. No evidence of rash. erythematous rash to chest and lower back

## 2021-03-14 NOTE — H&P ADULT - PROBLEM SELECTOR PLAN 3
Patient with lactate of 2.4.  Patient s/p 2 liters Normal saline bolus.  Repeat VBG in AM. Patient with blood cultures growing gram negative rods. Patient with blood cultures growing E.Coli.  Follow blood and urine cultures.  Patient started on Zosyn.

## 2021-03-14 NOTE — ED PROVIDER NOTE - ATTENDING CONTRIBUTION TO CARE
DR. NYE, ATTENDING MD-  I performed a face to face bedside interview with the patient regarding history of present illness, review of symptoms and past medical history. I completed an independent physical exam.  I have discussed the patient's plan of care with the PA.   Documentation as above in the note.    27 y/o female recent dx of pyelonephritis here after called back for pos blood cx.  Pt states she has been feeling unwell and having continued abd pain.  Had iv abx last night but did not fill abx rx yet today.  States she woke up this morning with hives.  No difficulty breathing or speaking.  Airway clear, will admit for bacteremia secondary to pyelo.  Obtain hcg cbc cmp vbg blood cx x2 ua ucx covid swab give iv abx steroid antihistamine admit for further care and evaluation.

## 2021-03-14 NOTE — H&P ADULT - NSHPPHYSICALEXAM_GEN_ALL_CORE
T(C): 36.4 (03-15-21 @ 01:42), Max: 38.1 (03-14-21 @ 21:00)  HR: 62 (03-15-21 @ 01:42) (62 - 97)  BP: 105/55 (03-15-21 @ 01:42) (100/60 - 109/63)  RR: 16 (03-15-21 @ 01:42) (16 - 20)  SpO2: 98% (03-15-21 @ 01:42) (98% - 100%)

## 2021-03-15 DIAGNOSIS — R78.81 BACTEREMIA: ICD-10-CM

## 2021-03-15 LAB
A1C WITH ESTIMATED AVERAGE GLUCOSE RESULT: 4.5 % — SIGNIFICANT CHANGE UP (ref 4–5.6)
ANION GAP SERPL CALC-SCNC: 7 MMOL/L — SIGNIFICANT CHANGE UP (ref 7–14)
BASOPHILS # BLD AUTO: 0 K/UL — SIGNIFICANT CHANGE UP (ref 0–0.2)
BASOPHILS # BLD AUTO: 0 K/UL — SIGNIFICANT CHANGE UP (ref 0–0.2)
BASOPHILS NFR BLD AUTO: 0 % — SIGNIFICANT CHANGE UP (ref 0–2)
BASOPHILS NFR BLD AUTO: 0 % — SIGNIFICANT CHANGE UP (ref 0–2)
BUN SERPL-MCNC: 8 MG/DL — SIGNIFICANT CHANGE UP (ref 7–23)
CALCIUM SERPL-MCNC: 8.6 MG/DL — SIGNIFICANT CHANGE UP (ref 8.4–10.5)
CHLORIDE SERPL-SCNC: 109 MMOL/L — HIGH (ref 98–107)
CHOLEST SERPL-MCNC: 134 MG/DL — SIGNIFICANT CHANGE UP
CO2 SERPL-SCNC: 23 MMOL/L — SIGNIFICANT CHANGE UP (ref 22–31)
CREAT SERPL-MCNC: 0.65 MG/DL — SIGNIFICANT CHANGE UP (ref 0.5–1.3)
CULTURE RESULTS: NO GROWTH — SIGNIFICANT CHANGE UP
EOSINOPHIL # BLD AUTO: 0 K/UL — SIGNIFICANT CHANGE UP (ref 0–0.5)
EOSINOPHIL # BLD AUTO: 0.38 K/UL — SIGNIFICANT CHANGE UP (ref 0–0.5)
EOSINOPHIL NFR BLD AUTO: 0 % — SIGNIFICANT CHANGE UP (ref 0–6)
EOSINOPHIL NFR BLD AUTO: 2.8 % — SIGNIFICANT CHANGE UP (ref 0–6)
ESTIMATED AVERAGE GLUCOSE: 82 MG/DL — SIGNIFICANT CHANGE UP (ref 68–114)
GLUCOSE SERPL-MCNC: 145 MG/DL — HIGH (ref 70–99)
HCT VFR BLD CALC: 31.6 % — LOW (ref 34.5–45)
HDLC SERPL-MCNC: 42 MG/DL — LOW
HGB BLD-MCNC: 10.2 G/DL — LOW (ref 11.5–15.5)
IANC: 6.05 K/UL — SIGNIFICANT CHANGE UP (ref 1.5–8.5)
LIPID PNL WITH DIRECT LDL SERPL: 77 MG/DL — SIGNIFICANT CHANGE UP
LYMPHOCYTES # BLD AUTO: 0.12 K/UL — LOW (ref 1–3.3)
LYMPHOCYTES # BLD AUTO: 0.37 K/UL — LOW (ref 1–3.3)
LYMPHOCYTES # BLD AUTO: 0.9 % — LOW (ref 13–44)
LYMPHOCYTES # BLD AUTO: 5.3 % — LOW (ref 13–44)
MAGNESIUM SERPL-MCNC: 2 MG/DL — SIGNIFICANT CHANGE UP (ref 1.6–2.6)
MCHC RBC-ENTMCNC: 30.4 PG — SIGNIFICANT CHANGE UP (ref 27–34)
MCHC RBC-ENTMCNC: 32.3 GM/DL — SIGNIFICANT CHANGE UP (ref 32–36)
MCV RBC AUTO: 94 FL — SIGNIFICANT CHANGE UP (ref 80–100)
MONOCYTES # BLD AUTO: 0.42 K/UL — SIGNIFICANT CHANGE UP (ref 0–0.9)
MONOCYTES # BLD AUTO: 1.29 K/UL — HIGH (ref 0–0.9)
MONOCYTES NFR BLD AUTO: 6.1 % — SIGNIFICANT CHANGE UP (ref 2–14)
MONOCYTES NFR BLD AUTO: 9.4 % — SIGNIFICANT CHANGE UP (ref 2–14)
NEUTROPHILS # BLD AUTO: 11.91 K/UL — HIGH (ref 1.8–7.4)
NEUTROPHILS # BLD AUTO: 5.83 K/UL — SIGNIFICANT CHANGE UP (ref 1.8–7.4)
NEUTROPHILS NFR BLD AUTO: 83.2 % — HIGH (ref 43–77)
NEUTROPHILS NFR BLD AUTO: 84.2 % — HIGH (ref 43–77)
NON HDL CHOLESTEROL: 92 MG/DL — SIGNIFICANT CHANGE UP
PHOSPHATE SERPL-MCNC: 2.5 MG/DL — SIGNIFICANT CHANGE UP (ref 2.5–4.5)
PLATELET # BLD AUTO: 115 K/UL — LOW (ref 150–400)
POTASSIUM SERPL-MCNC: 4.4 MMOL/L — SIGNIFICANT CHANGE UP (ref 3.5–5.3)
POTASSIUM SERPL-SCNC: 4.4 MMOL/L — SIGNIFICANT CHANGE UP (ref 3.5–5.3)
RBC # BLD: 3.36 M/UL — LOW (ref 3.8–5.2)
RBC # FLD: 12.8 % — SIGNIFICANT CHANGE UP (ref 10.3–14.5)
SARS-COV-2 RNA SPEC QL NAA+PROBE: SIGNIFICANT CHANGE UP
SODIUM SERPL-SCNC: 139 MMOL/L — SIGNIFICANT CHANGE UP (ref 135–145)
SPECIMEN SOURCE: SIGNIFICANT CHANGE UP
TRIGL SERPL-MCNC: 77 MG/DL — SIGNIFICANT CHANGE UP
TSH SERPL-MCNC: 0.31 UIU/ML — SIGNIFICANT CHANGE UP (ref 0.27–4.2)
WBC # BLD: 6.92 K/UL — SIGNIFICANT CHANGE UP (ref 3.8–10.5)
WBC # FLD AUTO: 6.92 K/UL — SIGNIFICANT CHANGE UP (ref 3.8–10.5)

## 2021-03-15 PROCEDURE — 99233 SBSQ HOSP IP/OBS HIGH 50: CPT | Mod: GC

## 2021-03-15 RX ORDER — PETROLATUM,WHITE
1 JELLY (GRAM) TOPICAL
Refills: 0 | Status: DISCONTINUED | OUTPATIENT
Start: 2021-03-15 | End: 2021-03-16

## 2021-03-15 RX ORDER — ACETAMINOPHEN 500 MG
650 TABLET ORAL EVERY 6 HOURS
Refills: 0 | Status: DISCONTINUED | OUTPATIENT
Start: 2021-03-15 | End: 2021-03-16

## 2021-03-15 RX ADMIN — FLUPHENAZINE HYDROCHLORIDE 2 MILLIGRAM(S): 1 TABLET, FILM COATED ORAL at 14:56

## 2021-03-15 RX ADMIN — Medication 650 MILLIGRAM(S): at 19:31

## 2021-03-15 RX ADMIN — ARIPIPRAZOLE 10 MILLIGRAM(S): 15 TABLET ORAL at 14:56

## 2021-03-15 RX ADMIN — FLUPHENAZINE HYDROCHLORIDE 2 MILLIGRAM(S): 1 TABLET, FILM COATED ORAL at 05:56

## 2021-03-15 RX ADMIN — Medication 1 MILLIGRAM(S): at 14:51

## 2021-03-15 RX ADMIN — SERTRALINE 100 MILLIGRAM(S): 25 TABLET, FILM COATED ORAL at 14:56

## 2021-03-15 RX ADMIN — SODIUM CHLORIDE 3 MILLILITER(S): 9 INJECTION INTRAMUSCULAR; INTRAVENOUS; SUBCUTANEOUS at 21:07

## 2021-03-15 RX ADMIN — Medication 650 MILLIGRAM(S): at 18:19

## 2021-03-15 RX ADMIN — PIPERACILLIN AND TAZOBACTAM 25 GRAM(S): 4; .5 INJECTION, POWDER, LYOPHILIZED, FOR SOLUTION INTRAVENOUS at 05:56

## 2021-03-15 RX ADMIN — SODIUM CHLORIDE 3 MILLILITER(S): 9 INJECTION INTRAMUSCULAR; INTRAVENOUS; SUBCUTANEOUS at 14:18

## 2021-03-15 RX ADMIN — Medication 1 APPLICATION(S): at 18:12

## 2021-03-15 RX ADMIN — PIPERACILLIN AND TAZOBACTAM 25 GRAM(S): 4; .5 INJECTION, POWDER, LYOPHILIZED, FOR SOLUTION INTRAVENOUS at 14:52

## 2021-03-15 RX ADMIN — BUPROPION HYDROCHLORIDE 300 MILLIGRAM(S): 150 TABLET, EXTENDED RELEASE ORAL at 14:56

## 2021-03-15 RX ADMIN — SODIUM CHLORIDE 3 MILLILITER(S): 9 INJECTION INTRAMUSCULAR; INTRAVENOUS; SUBCUTANEOUS at 05:58

## 2021-03-15 RX ADMIN — Medication 1 MILLIGRAM(S): at 05:56

## 2021-03-15 RX ADMIN — Medication 1 MILLIGRAM(S): at 21:07

## 2021-03-15 RX ADMIN — PIPERACILLIN AND TAZOBACTAM 25 GRAM(S): 4; .5 INJECTION, POWDER, LYOPHILIZED, FOR SOLUTION INTRAVENOUS at 21:07

## 2021-03-15 RX ADMIN — FLUPHENAZINE HYDROCHLORIDE 2 MILLIGRAM(S): 1 TABLET, FILM COATED ORAL at 21:07

## 2021-03-15 NOTE — PROGRESS NOTE ADULT - PROBLEM SELECTOR PLAN 1
Patient with blood cultures growing gram negative rods (E.coli)  Patient meets sepsis criteria due to fever and leukocytosis.  Monitor vitals q4h.  Continue Zosyn. Patient with blood cultures growing gram negative rods (E.coli) and found to have CT findings concerning of multifocal pyelonephritis of R kidney.   - Patient meets sepsis criteria due to fever and leukocytosis. Lilibeth  - Monitor vitals routine as pt has been clinically stable.   - Continue Zosyn for now until sensitivity

## 2021-03-15 NOTE — PROGRESS NOTE ADULT - ASSESSMENT
27 y/o F with PMH of Tourette syndrome. anxiety and depression presents to the ED with complaining of lower abdominal pain, found to have pyelonephritis. 27 y/o F with PMH of Tourette syndrome, anxiety and depression presents to the ED with complaining of lower abdominal pain, found to have pyelonephritis and possible GNR bacteremia now on zosyn.

## 2021-03-15 NOTE — PROGRESS NOTE ADULT - PROBLEM SELECTOR PLAN 3
Patient with blood cultures growing E.Coli.  Follow blood and urine cultures.  Patient started on Zosyn. Patient with blood cultures growing E.Coli.  - Follow blood and urine cultures.  - c/w Zosyn.

## 2021-03-15 NOTE — PATIENT PROFILE ADULT - NSPROPOAURINARYCATHETER_GEN_A_NUR
Vaccine Information Statement(s) for was given today. This has been reviewed, questions answered, and verbal consent given by Patient for injection(s) and administration of Influenza (Inactivated) and Pneumococcal Polysaccharide (PPSV).    1. Does the patient have a moderate to severe fever?  No  2. Has the patient had a serious reaction to a flu shot before?   No  3. Has the patient ever had [place] Syndrome within 6 weeks of a previous flu shot?  No  4. Does the patient have a serious allergy to eggs?  No  5. Is the patient less that 6 months of age?  No    Patient is eligible to receive the vaccine based on all questions being answered as 'No'.          Patient tolerated without incident. See immunization grid for documentation.   no

## 2021-03-15 NOTE — PROGRESS NOTE ADULT - PROBLEM SELECTOR PLAN 4
Patient with lactate of 2.4.  Patient s/p 2 liters Normal saline bolus.  Repeat VBG in AM. Patient with lactate of 2.4. Now improved.   Patient s/p 2 liters Normal saline bolus.  Monitor for now

## 2021-03-15 NOTE — PROGRESS NOTE ADULT - SUBJECTIVE AND OBJECTIVE BOX
PROGRESS NOTE:     CONTACT INFO:  Javi Frost MD PGY-1  Internal Medicine  [facility]: [phone]  [facility] [address]  If no response, please check Resident assignment   section of [place] for most up-to-date contact info  After 7PM, please page night float    Patient is a 26y old  Female who presents with a chief complaint of Pyelonephritis (14 Mar 2021 21:02)      SUBJECTIVE / OVERNIGHT EVENTS: Patient seen and evaluated at bedside. Otherwise, denies headaches, fever, chills, nausea, vomiting, dizziness, lightheadedness, SOB at rest, chest pain, palpitations, abdominal pain, acute onset focal weakness or sensory changes.      ADDITIONAL REVIEW OF SYSTEMS:    MEDICATIONS  (STANDING):  ARIPiprazole 10 milliGRAM(s) Oral daily  buPROPion XL . 300 milliGRAM(s) Oral daily  clonazePAM  Tablet 1 milliGRAM(s) Oral three times a day  fluPHENAZine 2 milliGRAM(s) Oral three times a day  heparin   Injectable 5000 Unit(s) SubCutaneous every 12 hours  piperacillin/tazobactam IVPB.. 3.375 Gram(s) IV Intermittent every 8 hours  sertraline 100 milliGRAM(s) Oral daily  sodium chloride 0.9% lock flush 3 milliLiter(s) IV Push every 8 hours    MEDICATIONS  (PRN):  acetaminophen   Tablet .. 650 milliGRAM(s) Oral every 6 hours PRN Temp greater or equal to 38C (100.4F), Mild Pain (1 - 3), Moderate Pain (4 - 6), Severe Pain (7 - 10)      CAPILLARY BLOOD GLUCOSE        I&O's Summary      PHYSICAL EXAM:  Vital Signs Last 24 Hrs  T(C): 36.4 (15 Mar 2021 05:42), Max: 38.1 (14 Mar 2021 21:00)  T(F): 97.5 (15 Mar 2021 05:42), Max: 100.5 (14 Mar 2021 21:00)  HR: 61 (15 Mar 2021 05:42) (61 - 97)  BP: 100/53 (15 Mar 2021 05:42) (100/53 - 109/63)  BP(mean): --  RR: 17 (15 Mar 2021 05:42) (16 - 20)  SpO2: 97% (15 Mar 2021 05:42) (97% - 100%)    CONSTITUTIONAL: NAD, well-developed  RESPIRATORY: Normal respiratory effort; lungs are clear to auscultation bilaterally  CARDIOVASCULAR: Regular rate and rhythm, normal S1 and S2, no murmur/rub/gallop; No lower extremity edema; Peripheral pulses are 2+ bilaterally  ABDOMEN: Nontender to palpation, normoactive bowel sounds, no rebound/guarding; No hepatosplenomegaly  MUSCLOSKELETAL: no clubbing or cyanosis of digits; no joint swelling or tenderness to palpation  PSYCH: A+O to person, place, and time; affect appropriate    LABS:                        11.5   13.70 )-----------( 166      ( 14 Mar 2021 19:25 )             36.3     03-15    139  |  109<H>  |  8   ----------------------------<  145<H>  4.4   |  23  |  0.65    Ca    8.6      15 Mar 2021 07:29  Phos  2.5     03-15  Mg     2.0     03-15    TPro  6.8  /  Alb  3.8  /  TBili  0.5  /  DBili  x   /  AST  22  /  ALT  21  /  AlkPhos  60  03-14    PT/INR - ( 13 Mar 2021 15:51 )   PT: 13.9 sec;   INR: 1.18 ratio         PTT - ( 13 Mar 2021 15:51 )  PTT:34.6 sec      Urinalysis Basic - ( 14 Mar 2021 19:25 )    Color: Yellow / Appearance: Clear / S.021 / pH: x  Gluc: x / Ketone: Small  / Bili: Negative / Urobili: 12 mg/dL   Blood: x / Protein: 30 mg/dL / Nitrite: Negative   Leuk Esterase: Small / RBC: 6 /HPF / WBC 16 /HPF   Sq Epi: x / Non Sq Epi: 3 /HPF / Bacteria: Few        Culture - Urine (collected 13 Mar 2021 22:00)  Source: .Urine Clean Catch (Midstream)  Final Report (14 Mar 2021 18:47):    <10,000 CFU/mL Normal Urogenital Ramona    Culture - Blood (collected 13 Mar 2021 21:57)  Source: .Blood Blood-Peripheral  Gram Stain (14 Mar 2021 08:59):    Growth in anaerobic bottle: Gram Negative Rods  Preliminary Report (14 Mar 2021 09:00):    Growth in anaerobic bottle: Gram Negative Rods    ***Blood Panel PCR results on this specimen are available    approximately 3 hours after the Gram stain result.***    Gram stain, PCR, and/or culture results may not always    correspond due to difference in methodologies.    ************************************************************    This PCR assay was performed by multiplex PCR. This    Assay tests for 66 bacterial and resistance gene targets.    Please refer to the [facility] [employer] test directory    at [email or web address] for details.  Organism: Blood Culture PCR (14 Mar 2021 12:49)  Organism: Blood Culture PCR (14 Mar 2021 12:49)    Culture - Blood (collected 13 Mar 2021 21:57)  Source: .Blood Blood-Peripheral  Preliminary Report (14 Mar 2021 23:02):    No growth to date.        RADIOLOGY & ADDITIONAL TESTS:       PROGRESS NOTE:     CONTACT INFO:  Javi Frost MD PGY-1  Internal Medicine  [facility]: [phone]  [facility] [address]  If no response, please check Resident assignment   section of [place] for most up-to-date contact info  After 7PM, please page night float    Patient is a 26y old  Female who presents with a chief complaint of Pyelonephritis (14 Mar 2021 21:02)    SUBJECTIVE / OVERNIGHT EVENTS: Patient seen and evaluated at bedside. Abd and flank pain in R side has improved since starting IV abx. No new nausea or emesis. States she is adopted and doesn't know FHx. Not sexually active and no STD/STI. No prior hx of urinary symptoms. No hx of reported DM. Otherwise, currently denies headaches, fever, chills, nausea, vomiting, dizziness, lightheadedness, SOB at rest, chest pain, palpitations, acute onset focal weakness or sensory changes.    MEDICATIONS  (STANDING):  ARIPiprazole 10 milliGRAM(s) Oral daily  buPROPion XL . 300 milliGRAM(s) Oral daily  clonazePAM  Tablet 1 milliGRAM(s) Oral three times a day  fluPHENAZine 2 milliGRAM(s) Oral three times a day  heparin   Injectable 5000 Unit(s) SubCutaneous every 12 hours  piperacillin/tazobactam IVPB.. 3.375 Gram(s) IV Intermittent every 8 hours  sertraline 100 milliGRAM(s) Oral daily  sodium chloride 0.9% lock flush 3 milliLiter(s) IV Push every 8 hours    MEDICATIONS  (PRN):  acetaminophen   Tablet .. 650 milliGRAM(s) Oral every 6 hours PRN Temp greater or equal to 38C (100.4F), Mild Pain (1 - 3), Moderate Pain (4 - 6), Severe Pain (7 - 10)    CAPILLARY BLOOD GLUCOSE    I&O's Summary      PHYSICAL EXAM:  Vital Signs Last 24 Hrs  T(C): 36.4 (15 Mar 2021 05:42), Max: 38.1 (14 Mar 2021 21:00)  T(F): 97.5 (15 Mar 2021 05:42), Max: 100.5 (14 Mar 2021 21:00)  HR: 61 (15 Mar 2021 05:42) (61 - 97)  BP: 100/53 (15 Mar 2021 05:42) (100/53 - 109/63)  BP(mean): --  RR: 17 (15 Mar 2021 05:42) (16 - 20)  SpO2: 97% (15 Mar 2021 05:42) (97% - 100%)    CONSTITUTIONAL: NAD, well-developed  RESPIRATORY: Normal respiratory effort; lungs are clear to auscultation bilaterally  CARDIOVASCULAR: Regular rate and rhythm, normal S1 and S2, no murmur/rub/gallop; No lower extremity edema; Peripheral pulses are 2+ bilaterally  ABDOMEN: Mild tender to palpation RLQ/ flank but reports improvement, normoactive bowel sounds, no rebound/guarding;   MUSCLOSKELETAL: no clubbing or cyanosis of digits; no joint swelling or tenderness to palpation  PSYCH: A+O to person, place, and time; affect appropriate    LABS:                        11.5   13.70 )-----------( 166      ( 14 Mar 2021 19:25 )             36.3     03-15    139  |  109<H>  |  8   ----------------------------<  145<H>  4.4   |  23  |  0.65    Ca    8.6      15 Mar 2021 07:29  Phos  2.5     03-15  Mg     2.0     03-15    TPro  6.8  /  Alb  3.8  /  TBili  0.5  /  DBili  x   /  AST  22  /  ALT  21  /  AlkPhos  60  03-14    PT/INR - ( 13 Mar 2021 15:51 )   PT: 13.9 sec;   INR: 1.18 ratio         PTT - ( 13 Mar 2021 15:51 )  PTT:34.6 sec      Urinalysis Basic - ( 14 Mar 2021 19:25 )    Color: Yellow / Appearance: Clear / S.021 / pH: x  Gluc: x / Ketone: Small  / Bili: Negative / Urobili: 12 mg/dL   Blood: x / Protein: 30 mg/dL / Nitrite: Negative   Leuk Esterase: Small / RBC: 6 /HPF / WBC 16 /HPF   Sq Epi: x / Non Sq Epi: 3 /HPF / Bacteria: Few    Culture - Urine (collected 13 Mar 2021 22:00)  Source: .Urine Clean Catch (Midstream)  Final Report (14 Mar 2021 18:47):    <10,000 CFU/mL Normal Urogenital Ramona    Culture - Blood (collected 13 Mar 2021 21:57)  Source: .Blood Blood-Peripheral  Gram Stain (14 Mar 2021 08:59):    Growth in anaerobic bottle: Gram Negative Rods  Preliminary Report (14 Mar 2021 09:00):    Growth in anaerobic bottle: Gram Negative Rods    ***Blood Panel PCR results on this specimen are available    approximately 3 hours after the Gram stain result.***    Gram stain, PCR, and/or culture results may not always    correspond due to difference in methodologies.    ************************************************************    This PCR assay was performed by multiplex PCR. This    Assay tests for 66 bacterial and resistance gene targets.    Please refer to the [facility] test directory    at [email or web address] for details.  Organism: Blood Culture PCR (14 Mar 2021 12:49)  Organism: Blood Culture PCR (14 Mar 2021 12:49)    Culture - Blood (collected 13 Mar 2021 21:57)  Source: .Blood Blood-Peripheral  Preliminary Report (14 Mar 2021 23:02):    No growth to date.    RADIOLOGY & ADDITIONAL TESTS:      < from: CT Abdomen and Pelvis w/ Oral Cont and w/ IV Cont (21 @ 21:39) >    EXAM:  CT ABDOMEN AND PELVIS OC IC                            PROCEDURE DATE:  2021          INTERPRETATION:  CLINICAL INFORMATION: Right lower quadrant pain    COMPARISON: None.    CONTRAST/COMPLICATIONS:  IV Contrast: Omnipaque 350 / 90 cc administered / 10 cc discarded.  Oral Contrast: Omnipaque 300  Complications: None reported at time of study completion    PROCEDURE:  CT of the Abdomen and Pelvis was performed.  Sagittal and coronal reformats were performed.    FINDINGS:  LOWER CHEST: Clear    LIVER: Focal hepatic steatosis adjacent to the gallbladder fossa.  BILE DUCTS: Normal caliber.  GALLBLADDER: Nonspecific edema along the hepatic margin.  SPLEEN: Within normal limits.  PANCREAS: Within normal limits.  ADRENALS: Within normal limits.  KIDNEYS/URETERS: Multifocal pyelonephritis involving the upper and lower pole right kidney, with uroepithelial hyperenhancement in the proximal right ureter. No abscess.    BLADDER: No abnormal mural thickening.  REPRODUCTIVE ORGANS: 2 cm right ovarian cyst. Possible bilateral hydrosalpinx..    BOWEL: No bowel obstruction. Enteric contrast transits to the rectum.Normal appendix.  PERITONEUM: No intraperitoneal free air.  VESSELS: Normal in caliber.  RETROPERITONEUM/LYMPH NODES: No hemorrhage. No enlarged lymph node measuring greater than 10 mm in short axis.  ABDOMINAL WALL: Intact  BONES: No acute osseous abnormality.    IMPRESSION:    1. Multifocal pyelonephritis in the right kidney without abscess.  2. Possible mild bilateral hydrosalpinx.    SUNNY BURT MD; Attending Radiologist  This document has been electronically signed. Mar 13 2021 10:22PM    < end of copied text >

## 2021-03-15 NOTE — PROGRESS NOTE ADULT - PROBLEM SELECTOR PLAN 2
Admit to medicine, continue monitoring.  CTAP showing Multifocal pyelonephritis involving the upper and lower pole right kidney, with uroepithelial hyperenhancement in the proximal right ureter.  Blood cultures positive for gram negative rods/ Found to be growing E. Coli.  Patient s/p Ceftriaxone 2000 mg.   Patient started on zosyn.  Monitor vitals closely.  Acetaminophen 650 prn q6h for Fever.  Follow up blood and urine cultures. Patient with RLQ abd pain and flank pain with CTAP showing Multifocal pyelonephritis involving the upper and lower pole right kidney, with uroepithelial hyperenhancement in the proximal right ureter.  - Blood cultures positive for gram negative rods/ Found to be growing E. Coli.  - Patient s/p Ceftriaxone 2000 mg, now started on zosyn   - PRN Acetaminophen 650 prn q6h for Fever.  - Follow up repeat blood and urine cultures.

## 2021-03-15 NOTE — PROGRESS NOTE ADULT - PROBLEM SELECTOR PLAN 9
Heparin subcutaneous 5000 units q12h. will discontinue heparin as low risk   diet regular  code full

## 2021-03-15 NOTE — PROGRESS NOTE ADULT - PROBLEM SELECTOR PLAN 5
WBC count of 13.70 likely in setting of pyelonephritis.  Continue to trend. WBC count of 13.70 likely in setting of pyelonephritis. Now resolved.   Continue to trend.

## 2021-03-16 ENCOUNTER — TRANSCRIPTION ENCOUNTER (OUTPATIENT)
Age: 26
End: 2021-03-16

## 2021-03-16 VITALS
OXYGEN SATURATION: 98 % | SYSTOLIC BLOOD PRESSURE: 110 MMHG | HEART RATE: 71 BPM | RESPIRATION RATE: 17 BRPM | DIASTOLIC BLOOD PRESSURE: 71 MMHG | TEMPERATURE: 99 F

## 2021-03-16 LAB
-  AMIKACIN: SIGNIFICANT CHANGE UP
-  AMPICILLIN/SULBACTAM: SIGNIFICANT CHANGE UP
-  AMPICILLIN: SIGNIFICANT CHANGE UP
-  AZTREONAM: SIGNIFICANT CHANGE UP
-  CEFAZOLIN: SIGNIFICANT CHANGE UP
-  CEFEPIME: SIGNIFICANT CHANGE UP
-  CEFOXITIN: SIGNIFICANT CHANGE UP
-  CEFTRIAXONE: SIGNIFICANT CHANGE UP
-  CIPROFLOXACIN: SIGNIFICANT CHANGE UP
-  ERTAPENEM: SIGNIFICANT CHANGE UP
-  GENTAMICIN: SIGNIFICANT CHANGE UP
-  IMIPENEM: SIGNIFICANT CHANGE UP
-  LEVOFLOXACIN: SIGNIFICANT CHANGE UP
-  MEROPENEM: SIGNIFICANT CHANGE UP
-  PIPERACILLIN/TAZOBACTAM: SIGNIFICANT CHANGE UP
-  TOBRAMYCIN: SIGNIFICANT CHANGE UP
-  TRIMETHOPRIM/SULFAMETHOXAZOLE: SIGNIFICANT CHANGE UP
ALBUMIN SERPL ELPH-MCNC: 3.1 G/DL — LOW (ref 3.3–5)
ALP SERPL-CCNC: 49 U/L — SIGNIFICANT CHANGE UP (ref 40–120)
ALT FLD-CCNC: 21 U/L — SIGNIFICANT CHANGE UP (ref 4–33)
ANION GAP SERPL CALC-SCNC: 7 MMOL/L — SIGNIFICANT CHANGE UP (ref 7–14)
AST SERPL-CCNC: 16 U/L — SIGNIFICANT CHANGE UP (ref 4–32)
BILIRUB SERPL-MCNC: 0.3 MG/DL — SIGNIFICANT CHANGE UP (ref 0.2–1.2)
BUN SERPL-MCNC: 7 MG/DL — SIGNIFICANT CHANGE UP (ref 7–23)
CALCIUM SERPL-MCNC: 8.6 MG/DL — SIGNIFICANT CHANGE UP (ref 8.4–10.5)
CHLORIDE SERPL-SCNC: 107 MMOL/L — SIGNIFICANT CHANGE UP (ref 98–107)
CO2 SERPL-SCNC: 25 MMOL/L — SIGNIFICANT CHANGE UP (ref 22–31)
CREAT SERPL-MCNC: 0.74 MG/DL — SIGNIFICANT CHANGE UP (ref 0.5–1.3)
CULTURE RESULTS: SIGNIFICANT CHANGE UP
GLUCOSE SERPL-MCNC: 85 MG/DL — SIGNIFICANT CHANGE UP (ref 70–99)
HCT VFR BLD CALC: 31.1 % — LOW (ref 34.5–45)
HGB BLD-MCNC: 9.9 G/DL — LOW (ref 11.5–15.5)
MAGNESIUM SERPL-MCNC: 1.8 MG/DL — SIGNIFICANT CHANGE UP (ref 1.6–2.6)
MCHC RBC-ENTMCNC: 30.1 PG — SIGNIFICANT CHANGE UP (ref 27–34)
MCHC RBC-ENTMCNC: 31.8 GM/DL — LOW (ref 32–36)
MCV RBC AUTO: 94.5 FL — SIGNIFICANT CHANGE UP (ref 80–100)
METHOD TYPE: SIGNIFICANT CHANGE UP
NRBC # BLD: 0 /100 WBCS — SIGNIFICANT CHANGE UP
NRBC # FLD: 0 K/UL — SIGNIFICANT CHANGE UP
ORGANISM # SPEC MICROSCOPIC CNT: SIGNIFICANT CHANGE UP
PHOSPHATE SERPL-MCNC: 2.4 MG/DL — LOW (ref 2.5–4.5)
PLATELET # BLD AUTO: 174 K/UL — SIGNIFICANT CHANGE UP (ref 150–400)
POTASSIUM SERPL-MCNC: 3.5 MMOL/L — SIGNIFICANT CHANGE UP (ref 3.5–5.3)
POTASSIUM SERPL-SCNC: 3.5 MMOL/L — SIGNIFICANT CHANGE UP (ref 3.5–5.3)
PROT SERPL-MCNC: 5.8 G/DL — LOW (ref 6–8.3)
RBC # BLD: 3.29 M/UL — LOW (ref 3.8–5.2)
RBC # FLD: 12.7 % — SIGNIFICANT CHANGE UP (ref 10.3–14.5)
SODIUM SERPL-SCNC: 139 MMOL/L — SIGNIFICANT CHANGE UP (ref 135–145)
SPECIMEN SOURCE: SIGNIFICANT CHANGE UP
WBC # BLD: 6.98 K/UL — SIGNIFICANT CHANGE UP (ref 3.8–10.5)
WBC # FLD AUTO: 6.98 K/UL — SIGNIFICANT CHANGE UP (ref 3.8–10.5)

## 2021-03-16 PROCEDURE — 99239 HOSP IP/OBS DSCHRG MGMT >30: CPT | Mod: GC

## 2021-03-16 RX ORDER — DIPHENHYDRAMINE HCL 50 MG
25 CAPSULE ORAL ONCE
Refills: 0 | Status: COMPLETED | OUTPATIENT
Start: 2021-03-16 | End: 2021-03-16

## 2021-03-16 RX ORDER — SODIUM,POTASSIUM PHOSPHATES 278-250MG
1 POWDER IN PACKET (EA) ORAL ONCE
Refills: 0 | Status: COMPLETED | OUTPATIENT
Start: 2021-03-16 | End: 2021-03-16

## 2021-03-16 RX ORDER — CIPROFLOXACIN LACTATE 400MG/40ML
1 VIAL (ML) INTRAVENOUS
Qty: 24 | Refills: 0
Start: 2021-03-16 | End: 2021-03-27

## 2021-03-16 RX ADMIN — Medication 650 MILLIGRAM(S): at 16:21

## 2021-03-16 RX ADMIN — Medication 1 APPLICATION(S): at 05:15

## 2021-03-16 RX ADMIN — PIPERACILLIN AND TAZOBACTAM 25 GRAM(S): 4; .5 INJECTION, POWDER, LYOPHILIZED, FOR SOLUTION INTRAVENOUS at 05:15

## 2021-03-16 RX ADMIN — SERTRALINE 100 MILLIGRAM(S): 25 TABLET, FILM COATED ORAL at 11:49

## 2021-03-16 RX ADMIN — PIPERACILLIN AND TAZOBACTAM 25 GRAM(S): 4; .5 INJECTION, POWDER, LYOPHILIZED, FOR SOLUTION INTRAVENOUS at 14:28

## 2021-03-16 RX ADMIN — FLUPHENAZINE HYDROCHLORIDE 2 MILLIGRAM(S): 1 TABLET, FILM COATED ORAL at 14:28

## 2021-03-16 RX ADMIN — Medication 650 MILLIGRAM(S): at 17:00

## 2021-03-16 RX ADMIN — Medication 650 MILLIGRAM(S): at 06:15

## 2021-03-16 RX ADMIN — Medication 1 MILLIGRAM(S): at 05:15

## 2021-03-16 RX ADMIN — FLUPHENAZINE HYDROCHLORIDE 2 MILLIGRAM(S): 1 TABLET, FILM COATED ORAL at 05:15

## 2021-03-16 RX ADMIN — Medication 1 PACKET(S): at 11:49

## 2021-03-16 RX ADMIN — Medication 25 MILLIGRAM(S): at 13:09

## 2021-03-16 RX ADMIN — SODIUM CHLORIDE 3 MILLILITER(S): 9 INJECTION INTRAMUSCULAR; INTRAVENOUS; SUBCUTANEOUS at 05:16

## 2021-03-16 RX ADMIN — Medication 1 MILLIGRAM(S): at 14:28

## 2021-03-16 RX ADMIN — SODIUM CHLORIDE 3 MILLILITER(S): 9 INJECTION INTRAMUSCULAR; INTRAVENOUS; SUBCUTANEOUS at 14:23

## 2021-03-16 RX ADMIN — Medication 1 APPLICATION(S): at 17:43

## 2021-03-16 RX ADMIN — ARIPIPRAZOLE 10 MILLIGRAM(S): 15 TABLET ORAL at 11:49

## 2021-03-16 RX ADMIN — Medication 650 MILLIGRAM(S): at 05:15

## 2021-03-16 RX ADMIN — BUPROPION HYDROCHLORIDE 300 MILLIGRAM(S): 150 TABLET, EXTENDED RELEASE ORAL at 11:49

## 2021-03-16 NOTE — DISCHARGE NOTE PROVIDER - NSDCCPTREATMENT_GEN_ALL_CORE_FT
PRINCIPAL PROCEDURE  Procedure: CT scan abdomen  Findings and Treatment: EXAM:  CT ABDOMEN AND PELVIS OC IC                        PROCEDURE DATE:  03/13/2021    FINDINGS:  LOWER CHEST: Clear  LIVER: Focal hepatic steatosis adjacent to the gallbladder fossa.  BILE DUCTS: Normal caliber.  GALLBLADDER: Nonspecific edema along the hepatic margin.  SPLEEN: Within normal limits.  PANCREAS: Within normal limits.  ADRENALS: Within normal limits.  KIDNEYS/URETERS: Multifocal pyelonephritis involving the upper and lower pole right kidney, with uroepithelial hyperenhancement in the proximal right ureter. No abscess.  BLADDER: No abnormal mural thickening.  REPRODUCTIVE ORGANS: 2 cm right ovarian cyst. Possible bilateral hydrosalpinx.  BOWEL: No bowel obstruction. Enteric contrast transits to the rectum.Normal appendix.  PERITONEUM: No intraperitoneal free air.  VESSELS: Normal in caliber.  RETROPERITONEUM/LYMPH NODES: No hemorrhage. No enlarged lymph node measuring greater than 10 mm in short axis.  ABDOMINAL WALL: Intact  BONES: No acute osseous abnormality.  IMPRESSION:  1. Multifocal pyelonephritis in the right kidney without abscess.  2. Possible mild bilateral hydrosalpinx.

## 2021-03-16 NOTE — PROGRESS NOTE ADULT - PROBLEM SELECTOR PLAN 1
Patient with blood cultures growing gram negative rods (E.coli) and found to have CT findings concerning of multifocal pyelonephritis of R kidney.   - Patient meets sepsis criteria due to fever and leukocytosis. Lilibeth  - Monitor vitals routine as pt has been clinically stable.   - Continue Zosyn for now until sensitivity Patient with blood cultures growing gram negative rods (E.coli) and found to have CT findings concerning of multifocal pyelonephritis of R kidney.   - Patient meets sepsis criteria due to fever and leukocytosis. Leukocytosis downtrended  - Monitor vitals routine as pt has been clinically stable.   - Continue Zosyn for now until sensitivity  - repeat blood cltrs 3/14 negative.

## 2021-03-16 NOTE — DISCHARGE NOTE PROVIDER - NSDCFUADDAPPT_GEN_ALL_CORE_FT
We have provided contact information for several locations for a primary care physician for follow up within 1-2 weeks. If these do not work for you, please visit the [facility] website and you can find other primary care providers that may be more convenient for you.

## 2021-03-16 NOTE — PROGRESS NOTE ADULT - ATTENDING COMMENTS
26 F h/o Tourette syndrome, anxiety and depression presenting with RLQ pain found to have pyelo on imaging. BCx with pan-sensitive E coli. Pt has been on Zosyn, remains afebrile and clinically improving. Repeat BCx neg. Will discharge on Cipro. Continue home psych meds    I spent 40 minutes planning and facilitating discharge for this patient
26 F h/o Tourette syndrome, anxiety and depression presenting with RLQ pain found to have pyelo on imaging. Cont zosyn, f/u UCx. BCx with GNR. Will repeat BCx. Continue home psych meds

## 2021-03-16 NOTE — DISCHARGE NOTE PROVIDER - NSFOLLOWUPCLINICS_GEN_ALL_ED_FT
[facility] Internal Medicine  General Internal Medicine  [address] [place] [address]  Phone: [phone]  Fax:   Follow Up Time: 2 weeks    [facility] [place]  Internal Medicine  [address] [place] [address]  Phone: [phone]  Fax: [phone]  Follow Up Time: 2 weeks    [facility]  Internal Medicine  [address] [place] [address]  Phone: [phone]  Fax: [phone]  Follow Up Time: 2 weeks

## 2021-03-16 NOTE — PROGRESS NOTE ADULT - PROBLEM SELECTOR PLAN 8
Continue Abilify 10 mg daily and fluphenazine 1 mg,6 tabs daily.
Continue Abilify 10 mg daily and fluphenazine 1 mg,6 tabs daily.

## 2021-03-16 NOTE — DISCHARGE NOTE PROVIDER - NSDCMRMEDTOKEN_GEN_ALL_CORE_FT
Abilify 10 mg oral tablet: 1 tab(s) orally once a day  buPROPion 300 mg/24 hours (XL) oral tablet, extended release: 1 tab(s) orally every 24 hours  ciprofloxacin 500 mg oral tablet: 1 tab(s) orally 2 times a day   clonazePAM 1 mg oral tablet: 1 tab(s) orally 3 times a day  fluPHENAZine: 6 milligram(s) orally once a day  sertraline 100 mg oral tablet: 1 tab(s) orally once a day

## 2021-03-16 NOTE — DISCHARGE NOTE PROVIDER - CARE PROVIDER_API CALL
Shubham Guidry)  Internal Medicine  [address], [place] [id number]  Phone: [phone]  Fax: [phone]  Follow Up Time:

## 2021-03-16 NOTE — DISCHARGE NOTE NURSING/CASE MANAGEMENT/SOCIAL WORK - NSDCFUADDAPPT_GEN_ALL_CORE_FT
We have provided contact information for several locations for a primary care physician for follow up within 1-2 weeks. If these do not work for you, please visit the [facility] website and you can find other primary care providers that may be more convenient for you.

## 2021-03-16 NOTE — DISCHARGE NOTE PROVIDER - NSDCCPCAREPLAN_GEN_ALL_CORE_FT
PRINCIPAL DISCHARGE DIAGNOSIS  Diagnosis: Pyelonephritis  Assessment and Plan of Treatment: You came into the hospital for right sided belly pain and were found to have an infection of your right kidney (Pyelonephritis) and due to that, developed a bloodstream bacterial infection. We started you on IV antibiotics but can now switch to oral ciprofloxacin 500mg twice a day until 3/28/2021. Please follow up with a primary care provider (information included in follow up section). If you develop any worsening pain, fevers or other new symptoms please return to the hospital.       PRINCIPAL DISCHARGE DIAGNOSIS  Diagnosis: Pyelonephritis  Assessment and Plan of Treatment: You came into the hospital for right sided belly pain and were found to have an infection of your right kidney (Pyelonephritis) and due to that, developed a bloodstream bacterial infection. We started you on IV antibiotics but can now switch to oral ciprofloxacin 500mg twice a day until 3/28/2021. Please follow up with a primary care provider (information included in follow up section). If you develop any worsening pain, fevers or other new symptoms please return to the hospital.  You should establish care with a PCP whoever is convenient for you. You should also follow up with a Gynecologist to repeat imaging of your uterus and fallopian tubes.

## 2021-03-16 NOTE — DISCHARGE NOTE NURSING/CASE MANAGEMENT/SOCIAL WORK - PATIENT PORTAL LINK FT
You can access the FollowMyHealth Patient Portal offered by [facility] by registering at the following website: [email or web address]. By joining [employer]’s FollowMyHealth portal, you will also be able to view your health information using other applications (apps) compatible with our system.

## 2021-03-16 NOTE — PROGRESS NOTE ADULT - PROBLEM SELECTOR PLAN 4
Patient with lactate of 2.4. Now improved.   Patient s/p 2 liters Normal saline bolus.  Monitor for now

## 2021-03-16 NOTE — PROGRESS NOTE ADULT - ASSESSMENT
25 y/o F with PMH of Tourette syndrome, anxiety and depression presents to the ED with complaining of lower abdominal pain, found to have pyelonephritis and possible GNR bacteremia now on zosyn. 27 y/o F with PMH of Tourette syndrome, anxiety and depression presents to the ED with complaining of lower abdominal pain, found to have pyelonephritis and possible GNR bacteremia now on zosyn. Repeat blood cltrs 3/14 negative.

## 2021-03-16 NOTE — PROGRESS NOTE ADULT - PROBLEM SELECTOR PLAN 2
Patient with RLQ abd pain and flank pain with CTAP showing Multifocal pyelonephritis involving the upper and lower pole right kidney, with uroepithelial hyperenhancement in the proximal right ureter.  - Blood cultures positive for gram negative rods/ Found to be growing E. Coli.  - Patient s/p Ceftriaxone 2000 mg, now started on zosyn   - PRN Acetaminophen 650 prn q6h for Fever.  - Follow up repeat blood and urine cultures. Patient with RLQ abd pain and flank pain with CTAP showing Multifocal pyelonephritis involving the upper and lower pole right kidney, with uroepithelial hyperenhancement in the proximal right ureter.  - Blood cultures positive for gram negative rods/ Found to be growing E. Coli.  - Patient s/p Ceftriaxone 2000 mg, now started on zosyn   - PRN Acetaminophen 650 prn q6h for Fever.  - 3/14 blood cltrs NGTD

## 2021-03-16 NOTE — PROGRESS NOTE ADULT - PROBLEM SELECTOR PLAN 7
Continue sertraline 100 mg daily and clonazepam1 mg TID.
Continue sertraline 100 mg daily and clonazepam1 mg TID.

## 2021-03-16 NOTE — PROGRESS NOTE ADULT - SUBJECTIVE AND OBJECTIVE BOX
PROGRESS NOTE:     CONTACT INFO:  Javi Frost MD PGY-1  Internal Medicine  [facility]: [phone]  [facility] [address]  If no response, please check Resident assignment   section of [place] for most up-to-date contact info  After 7PM, please page night float    Patient is a 26y old  Female who presents with a chief complaint of Pyelonephritis (15 Mar 2021 08:39)      SUBJECTIVE / OVERNIGHT EVENTS: Patient seen and evaluated at bedside. Otherwise, denies headaches, fever, chills, nausea, vomiting, dizziness, lightheadedness, SOB at rest, chest pain, palpitations, abdominal pain, acute onset focal weakness or sensory changes.      ADDITIONAL REVIEW OF SYSTEMS:    MEDICATIONS  (STANDING):  AQUAPHOR (petrolatum Ointment) 1 Application(s) Topical two times a day  ARIPiprazole 10 milliGRAM(s) Oral daily  buPROPion XL . 300 milliGRAM(s) Oral daily  clonazePAM  Tablet 1 milliGRAM(s) Oral three times a day  fluPHENAZine 2 milliGRAM(s) Oral three times a day  piperacillin/tazobactam IVPB.. 3.375 Gram(s) IV Intermittent every 8 hours  sertraline 100 milliGRAM(s) Oral daily  sodium chloride 0.9% lock flush 3 milliLiter(s) IV Push every 8 hours    MEDICATIONS  (PRN):  acetaminophen   Tablet .. 650 milliGRAM(s) Oral every 6 hours PRN Temp greater or equal to 38C (100.4F), Mild Pain (1 - 3), Moderate Pain (4 - 6), Severe Pain (7 - 10)      CAPILLARY BLOOD GLUCOSE        I&O's Summary      PHYSICAL EXAM:  Vital Signs Last 24 Hrs  T(C): 37.2 (16 Mar 2021 05:13), Max: 37.2 (16 Mar 2021 05:13)  T(F): 99 (16 Mar 2021 05:13), Max: 99 (16 Mar 2021 05:13)  HR: 83 (16 Mar 2021 05:13) (66 - 83)  BP: 109/69 (16 Mar 2021 05:13) (100/60 - 109/69)  BP(mean): --  RR: 16 (16 Mar 2021 05:13) (16 - 18)  SpO2: 96% (16 Mar 2021 05:13) (96% - 100%)    CONSTITUTIONAL: NAD, well-developed  RESPIRATORY: Normal respiratory effort; lungs are clear to auscultation bilaterally  CARDIOVASCULAR: Regular rate and rhythm, normal S1 and S2, no murmur/rub/gallop; No lower extremity edema; Peripheral pulses are 2+ bilaterally  ABDOMEN: Nontender to palpation, normoactive bowel sounds, no rebound/guarding; No hepatosplenomegaly  MUSCLOSKELETAL: no clubbing or cyanosis of digits; no joint swelling or tenderness to palpation  PSYCH: A+O to person, place, and time; affect appropriate    LABS:                        10.2   6.92  )-----------( 115      ( 15 Mar 2021 07:29 )             31.6     03-15    139  |  109<H>  |  8   ----------------------------<  145<H>  4.4   |  23  |  0.65    Ca    8.6      15 Mar 2021 07:29  Phos  2.5     03-15  Mg     2.0     03-15    TPro  6.8  /  Alb  3.8  /  TBili  0.5  /  DBili  x   /  AST  22  /  ALT  21  /  AlkPhos  60  03-14          Urinalysis Basic - ( 14 Mar 2021 19:25 )    Color: Yellow / Appearance: Clear / S.021 / pH: x  Gluc: x / Ketone: Small  / Bili: Negative / Urobili: 12 mg/dL   Blood: x / Protein: 30 mg/dL / Nitrite: Negative   Leuk Esterase: Small / RBC: 6 /HPF / WBC 16 /HPF   Sq Epi: x / Non Sq Epi: 3 /HPF / Bacteria: Few        Culture - Blood (collected 14 Mar 2021 23:03)  Source: .Blood Blood-Peripheral  Preliminary Report (16 Mar 2021 01:03):    No growth to date.    Culture - Blood (collected 14 Mar 2021 23:01)  Source: .Blood Blood-Venous  Preliminary Report (16 Mar 2021 01:03):    No growth to date.    Culture - Urine (collected 14 Mar 2021 18:12)  Source: .Urine Clean Catch (Midstream)  Final Report (15 Mar 2021 22:32):    No growth    Culture - Urine (collected 13 Mar 2021 22:00)  Source: .Urine Clean Catch (Midstream)  Final Report (14 Mar 2021 18:47):    <10,000 CFU/mL Normal Urogenital Ramona    Culture - Blood (collected 13 Mar 2021 21:57)  Source: .Blood Blood-Peripheral  Gram Stain (14 Mar 2021 08:59):    Growth in anaerobic bottle: Gram Negative Rods  Preliminary Report (15 Mar 2021 11:03):    Growth in anaerobic bottle: Escherichia coli    ***Blood Panel PCR results on this specimen are available    approximately 3 hours after the Gram stain result.***    Gram stain, PCR, and/or culture results may not always    correspond due to difference in methodologies.    ************************************************************    This PCR assay was performed by multiplex PCR. This    Assay tests for 66 bacterial and resistance gene targets.    Please refer to the [facility] test directory    at [email or web address] for details.  Organism: Blood Culture PCR (14 Mar 2021 12:49)  Organism: Blood Culture PCR (14 Mar 2021 12:49)    Culture - Blood (collected 13 Mar 2021 21:57)  Source: .Blood Blood-Peripheral  Preliminary Report (14 Mar 2021 23:02):    No growth to date.        RADIOLOGY & ADDITIONAL TESTS:       PROGRESS NOTE:     CONTACT INFO:  Javi Frost MD PGY-1  Internal Medicine  [facility]: [phone]  [facility] [id number]  If no response, please check Resident assignment   section of [place] for most up-to-date contact info  After 7PM, please page night float    Patient is a 26y old  Female who presents with a chief complaint of Pyelonephritis (15 Mar 2021 08:39)      SUBJECTIVE / OVERNIGHT EVENTS: Patient seen and evaluated at bedside. Continues to have improvement in RLQ pain. No fevers overnight or other complaints. Denies headaches, fever, chills, nausea, vomiting, dizziness, lightheadedness, SOB at rest, chest pain, palpitations, acute onset focal weakness or sensory changes.      ADDITIONAL REVIEW OF SYSTEMS:    MEDICATIONS  (STANDING):  AQUAPHOR (petrolatum Ointment) 1 Application(s) Topical two times a day  ARIPiprazole 10 milliGRAM(s) Oral daily  buPROPion XL . 300 milliGRAM(s) Oral daily  clonazePAM  Tablet 1 milliGRAM(s) Oral three times a day  fluPHENAZine 2 milliGRAM(s) Oral three times a day  piperacillin/tazobactam IVPB.. 3.375 Gram(s) IV Intermittent every 8 hours  sertraline 100 milliGRAM(s) Oral daily  sodium chloride 0.9% lock flush 3 milliLiter(s) IV Push every 8 hours    MEDICATIONS  (PRN):  acetaminophen   Tablet .. 650 milliGRAM(s) Oral every 6 hours PRN Temp greater or equal to 38C (100.4F), Mild Pain (1 - 3), Moderate Pain (4 - 6), Severe Pain (7 - 10)    CAPILLARY BLOOD GLUCOSE    I&O's Summary    PHYSICAL EXAM:  Vital Signs Last 24 Hrs  T(C): 37.2 (16 Mar 2021 05:13), Max: 37.2 (16 Mar 2021 05:13)  T(F): 99 (16 Mar 2021 05:13), Max: 99 (16 Mar 2021 05:13)  HR: 83 (16 Mar 2021 05:13) (66 - 83)  BP: 109/69 (16 Mar 2021 05:13) (100/60 - 109/69)  BP(mean): --  RR: 16 (16 Mar 2021 05:13) (16 - 18)  SpO2: 96% (16 Mar 2021 05:13) (96% - 100%)    CONSTITUTIONAL: NAD, well-developed  RESPIRATORY: Normal respiratory effort; lungs are clear to auscultation bilaterally  CARDIOVASCULAR: Regular rate and rhythm, normal S1 and S2, no murmur/rub/gallop; No lower extremity edema; Peripheral pulses are 2+ bilaterally  ABDOMEN: Mild tender to palpation RLQ/ flank but reports improvement, normoactive bowel sounds, no rebound/guarding;   MUSCLOSKELETAL: no clubbing or cyanosis of digits; no joint swelling or tenderness to palpation  PSYCH: A+O to person, place, and time; affect appropriate    LABS:                        10.2   6.92  )-----------( 115      ( 15 Mar 2021 07:29 )             31.6     03-15    139  |  109<H>  |  8   ----------------------------<  145<H>  4.4   |  23  |  0.65    Ca    8.6      15 Mar 2021 07:29  Phos  2.5     03-15  Mg     2.0     03-15    TPro  6.8  /  Alb  3.8  /  TBili  0.5  /  DBili  x   /  AST  22  /  ALT  21  /  AlkPhos  60  03-14    Urinalysis Basic - ( 14 Mar 2021 19:25 )    Color: Yellow / Appearance: Clear / S.021 / pH: x  Gluc: x / Ketone: Small  / Bili: Negative / Urobili: 12 mg/dL   Blood: x / Protein: 30 mg/dL / Nitrite: Negative   Leuk Esterase: Small / RBC: 6 /HPF / WBC 16 /HPF   Sq Epi: x / Non Sq Epi: 3 /HPF / Bacteria: Few    Culture - Blood (collected 14 Mar 2021 23:03)  Source: .Blood Blood-Peripheral  Preliminary Report (16 Mar 2021 01:03):    No growth to date.    Culture - Blood (collected 14 Mar 2021 23:01)  Source: .Blood Blood-Venous  Preliminary Report (16 Mar 2021 01:03):    No growth to date.    Culture - Urine (collected 14 Mar 2021 18:12)  Source: .Urine Clean Catch (Midstream)  Final Report (15 Mar 2021 22:32):    No growth    Culture - Urine (collected 13 Mar 2021 22:00)  Source: .Urine Clean Catch (Midstream)  Final Report (14 Mar 2021 18:47):    <10,000 CFU/mL Normal Urogenital Ramona    Culture - Blood (collected 13 Mar 2021 21:57)  Source: .Blood Blood-Peripheral  Gram Stain (14 Mar 2021 08:59):    Growth in anaerobic bottle: Gram Negative Rods  Preliminary Report (15 Mar 2021 11:03):    Growth in anaerobic bottle: Escherichia coli    ***Blood Panel PCR results on this specimen are available    approximately 3 hours after the Gram stain result.***    Gram stain, PCR, and/or culture results may not always    correspond due to difference in methodologies.    ************************************************************    This PCR assay was performed by multiplex PCR. This    Assay tests for 66 bacterial and resistance gene targets.    Please refer to the [facility] test directory    at [email or web address] for details.  Organism: Blood Culture PCR (14 Mar 2021 12:49)  Organism: Blood Culture PCR (14 Mar 2021 12:49)    Culture - Blood (collected 13 Mar 2021 21:57)  Source: .Blood Blood-Peripheral  Preliminary Report (14 Mar 2021 23:02):    No growth to date.    RADIOLOGY & ADDITIONAL TESTS:

## 2021-03-16 NOTE — DISCHARGE NOTE PROVIDER - HOSPITAL COURSE
25 y/o F with PMH of Tourette syndrome, anxiety and depression presents to the ED with complaining of RLQ abd pain and R flank pain with associated fevers, found to have pyelonephritis and e coli bacteremia. In ED patient latest vitals are as follows T 100.5, HR 97, /58, RR 18 with oxygen saturation of 98% on RA. Labs notable for leukocytosis of 13.7, lactate 2.4, and UA w/ WBC, proteinuria, blood cltr from 3/13 showing e coli pansensitive. Patient received 2 grams of IVPB Ceftriaxone and received 2 liters NS bolus. Patient with blood cultures growing gram negative rods (E.coli) and found to have CT findings concerning of multifocal pyelonephritis of R kidney, mild b/l hydrosalpynx. Patient was switched from CTX to zosyn until repeat clutrs were negative and e coli sensitivity report was generated. Repeat blood cltrs from 3/14 were negative. Regarding source of pyelonephritis, patient denied prior hx, congenital hx (but is adopted so unsure of Fhx), not sexually active or had STI/STD. Patient follows up with GYN but has no PCP given young age. Discussed with medicine attending, patient will be discharged on ciprofloxacin for 14 days from first set of negative cultures (3/14) so they will end on 3/28. Patient will be offered medicine PCP for follow up. 25 y/o F with PMH of Tourette syndrome, anxiety and depression presents to the ED with complaining of RLQ abd pain and R flank pain with associated fevers, found to have pyelonephritis and e coli bacteremia. In ED patient latest vitals are as follows T 100.5, HR 97, /58, RR 18 with oxygen saturation of 98% on RA. Labs notable for leukocytosis of 13.7, lactate 2.4, and UA w/ WBC, proteinuria, blood cltr from 3/13 showing e coli pansensitive. Patient received 2 grams of IVPB Ceftriaxone and received 2 liters NS bolus. Patient with blood cultures growing gram negative rods (E.coli) and found to have CT findings concerning of multifocal pyelonephritis of R kidney, mild b/l hydrosalpynx. Patient was switched from CTX to zosyn until repeat cultures were negative and e coli sensitivity report was generated. Repeat blood cltrs from 3/14 were negative. Regarding source of pyelonephritis, patient denied prior hx, congenital hx (but is adopted so unsure of Fhx), not sexually active or had STI/STD. Patient follows up with GYN but has no PCP given young age. Discussed with medicine attending, patient will be discharged on ciprofloxacin for 14 days from first set of negative cultures (3/14) so they will end on 3/28. Patient will be offered medicine PCP for follow up and should follow up with her GYNecologist regarding her hydrosalpinx.

## 2021-03-18 LAB
CULTURE RESULTS: SIGNIFICANT CHANGE UP
SPECIMEN SOURCE: SIGNIFICANT CHANGE UP

## 2021-03-20 LAB
CULTURE RESULTS: SIGNIFICANT CHANGE UP
CULTURE RESULTS: SIGNIFICANT CHANGE UP
SPECIMEN SOURCE: SIGNIFICANT CHANGE UP
SPECIMEN SOURCE: SIGNIFICANT CHANGE UP

## 2024-11-12 NOTE — ED ADULT NURSE NOTE - NS ED NOTE ABUSE RESPONSE YN
Subjective   Patient ID: Thanh Padron is a 20 y.o. male who presents for Follow-up.    Pt will occasionally wake up having wet the bed. Has had an issue with this his entire life. Tries to avoid liquids before bed which helps but will still have these episodes occur.    Never made an appt w/ psychiatry d/t forgetting about it. Took zyprexa but all he noticed was that he got tired from it. Mentions it did help with sleep.        Review of Systems   Psychiatric/Behavioral:  Positive for dysphoric mood. Negative for sleep disturbance and suicidal ideas. The patient is nervous/anxious.        /72 (BP Location: Right arm, Patient Position: Sitting)   Pulse 73   Resp 20   Wt 116 kg (256 lb)   SpO2 97%   BMI 33.78 kg/m²   Objective   Physical Exam  Constitutional:       General: He is not in acute distress.     Appearance: He is obese. He is not ill-appearing, toxic-appearing or diaphoretic.   HENT:      Head: Normocephalic and atraumatic.   Eyes:      Conjunctiva/sclera: Conjunctivae normal.   Neurological:      Mental Status: He is alert.         Assessment/Plan   Problem List Items Addressed This Visit             ICD-10-CM    Severe episode of recurrent major depressive disorder, without psychotic features (Multi) F33.2     -Pt remains notably depressed. Tried zyprexa but didn't notice much of a difference besides sleeping better but being more tired during the day.  -Will decrease zyprexa dose from 10mg at bedtime to 5mg. Pt agreeable; understands to split his pills in half.  -Again referring pt to psychiatry and psychology. Emphasized importance of being seen and getting established. Pt once again agreeable. Encouraged to call and schedule ASAP.         Relevant Medications    OLANZapine zydis (ZyPREXA Zydis) 10 mg disintegrating tablet    venlafaxine XR (Effexor-XR) 37.5 mg 24 hr capsule    Other Relevant Orders    Referral to Psychology    Referral to Psychiatry            Radha Montez MD 11/12/24 11:57  AM    Yes